# Patient Record
Sex: MALE | Race: WHITE | NOT HISPANIC OR LATINO
[De-identification: names, ages, dates, MRNs, and addresses within clinical notes are randomized per-mention and may not be internally consistent; named-entity substitution may affect disease eponyms.]

---

## 2017-03-02 ENCOUNTER — LABORATORY RESULT (OUTPATIENT)
Age: 62
End: 2017-03-02

## 2017-03-02 ENCOUNTER — APPOINTMENT (OUTPATIENT)
Dept: INTERNAL MEDICINE | Facility: CLINIC | Age: 62
End: 2017-03-02

## 2017-03-03 LAB
25(OH)D3 SERPL-MCNC: 30 NG/ML
ALBUMIN SERPL ELPH-MCNC: 4.6 G/DL
ALP BLD-CCNC: 67 U/L
ALT SERPL-CCNC: 25 U/L
ANION GAP SERPL CALC-SCNC: 17 MMOL/L
APPEARANCE: CLEAR
AST SERPL-CCNC: 20 U/L
BASOPHILS # BLD AUTO: 0.04 K/UL
BASOPHILS NFR BLD AUTO: 0.7 %
BILIRUB SERPL-MCNC: 0.8 MG/DL
BILIRUBIN URINE: NEGATIVE
BLOOD URINE: NEGATIVE
BUN SERPL-MCNC: 16 MG/DL
CALCIUM SERPL-MCNC: 9.7 MG/DL
CHLORIDE SERPL-SCNC: 102 MMOL/L
CHOLEST SERPL-MCNC: 188 MG/DL
CHOLEST/HDLC SERPL: 3.9 RATIO
CO2 SERPL-SCNC: 20 MMOL/L
COLOR: YELLOW
CREAT SERPL-MCNC: 1.24 MG/DL
EOSINOPHIL # BLD AUTO: 0.13 K/UL
EOSINOPHIL NFR BLD AUTO: 2.1 %
ERYTHROCYTE [SEDIMENTATION RATE] IN BLOOD BY WESTERGREN METHOD: 16 MM/HR
GLUCOSE QUALITATIVE U: NORMAL MG/DL
GLUCOSE SERPL-MCNC: 113 MG/DL
HCT VFR BLD CALC: 45.5 %
HDLC SERPL-MCNC: 48 MG/DL
HGB BLD-MCNC: 15.5 G/DL
IMM GRANULOCYTES NFR BLD AUTO: 0.2 %
KETONES URINE: NEGATIVE
LDLC SERPL CALC-MCNC: 102 MG/DL
LDLC SERPL DIRECT ASSAY-MCNC: 121 MG/DL
LEUKOCYTE ESTERASE URINE: NEGATIVE
LYMPHOCYTES # BLD AUTO: 1.84 K/UL
LYMPHOCYTES NFR BLD AUTO: 29.9 %
MAN DIFF?: NORMAL
MCHC RBC-ENTMCNC: 31.6 PG
MCHC RBC-ENTMCNC: 34.1 GM/DL
MCV RBC AUTO: 92.9 FL
MONOCYTES # BLD AUTO: 0.46 K/UL
MONOCYTES NFR BLD AUTO: 7.5 %
NEUTROPHILS # BLD AUTO: 3.67 K/UL
NEUTROPHILS NFR BLD AUTO: 59.6 %
NITRITE URINE: NEGATIVE
PH URINE: 5.5
PLATELET # BLD AUTO: 211 K/UL
POTASSIUM SERPL-SCNC: 4.2 MMOL/L
PROT SERPL-MCNC: 7.7 G/DL
PROTEIN URINE: NEGATIVE MG/DL
PSA SERPL-MCNC: 2.23 NG/ML
RBC # BLD: 4.9 M/UL
RBC # FLD: 13.6 %
SAVE SPECIMEN: NORMAL
SODIUM SERPL-SCNC: 139 MMOL/L
SPECIFIC GRAVITY URINE: 1.02
T3 SERPL-MCNC: 120 NG/DL
T3RU NFR SERPL: 0.99 INDEX
T4 FREE SERPL-MCNC: 1.2 NG/DL
TRIGL SERPL-MCNC: 189 MG/DL
TSH SERPL-ACNC: 2.44 UIU/ML
URATE SERPL-MCNC: 7.1 MG/DL
UROBILINOGEN URINE: NORMAL MG/DL
WBC # FLD AUTO: 6.15 K/UL

## 2017-03-10 ENCOUNTER — APPOINTMENT (OUTPATIENT)
Dept: INTERNAL MEDICINE | Facility: CLINIC | Age: 62
End: 2017-03-10

## 2017-03-10 VITALS
SYSTOLIC BLOOD PRESSURE: 138 MMHG | BODY MASS INDEX: 33.34 KG/M2 | TEMPERATURE: 96.9 F | DIASTOLIC BLOOD PRESSURE: 80 MMHG | WEIGHT: 220 LBS | HEIGHT: 68 IN

## 2017-07-20 ENCOUNTER — APPOINTMENT (OUTPATIENT)
Dept: INTERNAL MEDICINE | Facility: CLINIC | Age: 62
End: 2017-07-20

## 2017-07-20 VITALS
SYSTOLIC BLOOD PRESSURE: 124 MMHG | TEMPERATURE: 98.5 F | WEIGHT: 218 LBS | HEIGHT: 68 IN | DIASTOLIC BLOOD PRESSURE: 78 MMHG | BODY MASS INDEX: 33.04 KG/M2

## 2017-07-20 DIAGNOSIS — J30.2 OTHER SEASONAL ALLERGIC RHINITIS: ICD-10-CM

## 2017-07-20 DIAGNOSIS — H91.91 UNSPECIFIED HEARING LOSS, RIGHT EAR: ICD-10-CM

## 2017-07-20 RX ORDER — ASPIRIN 81 MG
6.5 TABLET, DELAYED RELEASE (ENTERIC COATED) ORAL
Qty: 1 | Refills: 0 | Status: ACTIVE | COMMUNITY
Start: 2017-07-20 | End: 1900-01-01

## 2018-03-05 ENCOUNTER — APPOINTMENT (OUTPATIENT)
Dept: RHEUMATOLOGY | Facility: CLINIC | Age: 63
End: 2018-03-05
Payer: MEDICARE

## 2018-03-05 VITALS
HEIGHT: 68 IN | TEMPERATURE: 98.1 F | WEIGHT: 220 LBS | DIASTOLIC BLOOD PRESSURE: 73 MMHG | BODY MASS INDEX: 33.34 KG/M2 | OXYGEN SATURATION: 98 % | HEART RATE: 79 BPM | SYSTOLIC BLOOD PRESSURE: 144 MMHG

## 2018-03-05 DIAGNOSIS — Z79.899 OTHER LONG TERM (CURRENT) DRUG THERAPY: ICD-10-CM

## 2018-03-05 PROCEDURE — 99214 OFFICE O/P EST MOD 30 MIN: CPT

## 2018-03-06 LAB
ALBUMIN SERPL ELPH-MCNC: 4.7 G/DL
ALP BLD-CCNC: 72 U/L
ALT SERPL-CCNC: 24 U/L
ANION GAP SERPL CALC-SCNC: 15 MMOL/L
AST SERPL-CCNC: 19 U/L
BASOPHILS # BLD AUTO: 0.03 K/UL
BASOPHILS NFR BLD AUTO: 0.5 %
BILIRUB SERPL-MCNC: 0.8 MG/DL
BUN SERPL-MCNC: 15 MG/DL
CALCIUM SERPL-MCNC: 9.4 MG/DL
CHLORIDE SERPL-SCNC: 103 MMOL/L
CHOLEST SERPL-MCNC: 189 MG/DL
CHOLEST/HDLC SERPL: 4 RATIO
CO2 SERPL-SCNC: 23 MMOL/L
CREAT SERPL-MCNC: 1.07 MG/DL
CRP SERPL-MCNC: 0.4 MG/DL
EOSINOPHIL # BLD AUTO: 0.11 K/UL
EOSINOPHIL NFR BLD AUTO: 1.8 %
ERYTHROCYTE [SEDIMENTATION RATE] IN BLOOD BY WESTERGREN METHOD: 22 MM/HR
GLUCOSE SERPL-MCNC: 99 MG/DL
HBA1C MFR BLD HPLC: 5.3 %
HCT VFR BLD CALC: 44.7 %
HDLC SERPL-MCNC: 47 MG/DL
HGB BLD-MCNC: 15 G/DL
IMM GRANULOCYTES NFR BLD AUTO: 0.2 %
LDLC SERPL CALC-MCNC: 99 MG/DL
LYMPHOCYTES # BLD AUTO: 1.87 K/UL
LYMPHOCYTES NFR BLD AUTO: 30.7 %
MAN DIFF?: NORMAL
MCHC RBC-ENTMCNC: 32.3 PG
MCHC RBC-ENTMCNC: 33.6 GM/DL
MCV RBC AUTO: 96.1 FL
MONOCYTES # BLD AUTO: 0.42 K/UL
MONOCYTES NFR BLD AUTO: 6.9 %
NEUTROPHILS # BLD AUTO: 3.66 K/UL
NEUTROPHILS NFR BLD AUTO: 59.9 %
PLATELET # BLD AUTO: 222 K/UL
POTASSIUM SERPL-SCNC: 4.4 MMOL/L
PROT SERPL-MCNC: 7.6 G/DL
PSA SERPL-MCNC: 3.15 NG/ML
RBC # BLD: 4.65 M/UL
RBC # FLD: 13.7 %
SODIUM SERPL-SCNC: 141 MMOL/L
T4 FREE SERPL-MCNC: 1.2 NG/DL
TRIGL SERPL-MCNC: 215 MG/DL
TSH SERPL-ACNC: 2.71 UIU/ML
URATE SERPL-MCNC: 7.6 MG/DL
WBC # FLD AUTO: 6.1 K/UL

## 2018-03-13 ENCOUNTER — RX RENEWAL (OUTPATIENT)
Age: 63
End: 2018-03-13

## 2018-03-22 ENCOUNTER — APPOINTMENT (OUTPATIENT)
Dept: INTERNAL MEDICINE | Facility: CLINIC | Age: 63
End: 2018-03-22

## 2018-03-27 ENCOUNTER — APPOINTMENT (OUTPATIENT)
Dept: INTERNAL MEDICINE | Facility: CLINIC | Age: 63
End: 2018-03-27
Payer: COMMERCIAL

## 2018-03-27 VITALS
DIASTOLIC BLOOD PRESSURE: 90 MMHG | SYSTOLIC BLOOD PRESSURE: 140 MMHG | HEIGHT: 68 IN | BODY MASS INDEX: 32.58 KG/M2 | WEIGHT: 215 LBS | TEMPERATURE: 97.2 F

## 2018-03-27 VITALS — SYSTOLIC BLOOD PRESSURE: 130 MMHG | DIASTOLIC BLOOD PRESSURE: 80 MMHG

## 2018-03-27 DIAGNOSIS — K21.9 GASTRO-ESOPHAGEAL REFLUX DISEASE W/OUT ESOPHAGITIS: ICD-10-CM

## 2018-03-27 DIAGNOSIS — Z12.11 ENCOUNTER FOR SCREENING FOR MALIGNANT NEOPLASM OF COLON: ICD-10-CM

## 2018-03-27 PROCEDURE — 94010 BREATHING CAPACITY TEST: CPT

## 2018-03-27 PROCEDURE — 93000 ELECTROCARDIOGRAM COMPLETE: CPT

## 2018-03-27 PROCEDURE — 99397 PER PM REEVAL EST PAT 65+ YR: CPT | Mod: 25

## 2018-06-22 ENCOUNTER — APPOINTMENT (OUTPATIENT)
Dept: INTERNAL MEDICINE | Facility: CLINIC | Age: 63
End: 2018-06-22

## 2018-07-24 PROBLEM — J30.2 SEASONAL ALLERGIES: Status: ACTIVE | Noted: 2017-07-20

## 2019-03-05 ENCOUNTER — MEDICATION RENEWAL (OUTPATIENT)
Age: 64
End: 2019-03-05

## 2019-03-29 ENCOUNTER — TRANSCRIPTION ENCOUNTER (OUTPATIENT)
Age: 64
End: 2019-03-29

## 2019-04-05 ENCOUNTER — LABORATORY RESULT (OUTPATIENT)
Age: 64
End: 2019-04-05

## 2019-04-05 ENCOUNTER — APPOINTMENT (OUTPATIENT)
Dept: INTERNAL MEDICINE | Facility: CLINIC | Age: 64
End: 2019-04-05
Payer: MEDICARE

## 2019-04-05 VITALS
OXYGEN SATURATION: 97 % | TEMPERATURE: 97.3 F | DIASTOLIC BLOOD PRESSURE: 90 MMHG | WEIGHT: 220 LBS | HEART RATE: 94 BPM | BODY MASS INDEX: 33.45 KG/M2 | SYSTOLIC BLOOD PRESSURE: 140 MMHG

## 2019-04-05 VITALS — HEIGHT: 68 IN

## 2019-04-05 PROCEDURE — 93000 ELECTROCARDIOGRAM COMPLETE: CPT

## 2019-04-05 PROCEDURE — 99214 OFFICE O/P EST MOD 30 MIN: CPT | Mod: 25

## 2019-04-05 PROCEDURE — G0438: CPT | Mod: 25

## 2019-04-05 PROCEDURE — 90670 PCV13 VACCINE IM: CPT

## 2019-04-05 PROCEDURE — G0009: CPT

## 2019-04-05 PROCEDURE — 94010 BREATHING CAPACITY TEST: CPT

## 2019-04-05 NOTE — ASSESSMENT
[FreeTextEntry1] : \par GERD / Cobb's - offered EGD\par \par trigger finger - offered ortho/ hand surgeon\par \par elevated BP - check 24 hour monitor\par \par hcm\par check CPE labs\par Prevnar 13 today\par colonoscopy\par healthy diet and exercise

## 2019-04-05 NOTE — HEALTH RISK ASSESSMENT
[Good] : ~his/her~  mood as  good [0] : 1) Little interest or pleasure doing things: Not at all (0) [1] : 2) Feeling down, depressed, or hopeless for several days (1) [With Family] : lives with family [# of Members in Household ___] :  household currently consist of [unfilled] member(s) [Unemployed] : unemployed [Single] : single [Fully functional (bathing, dressing, toileting, transferring, walking, feeding)] : Fully functional (bathing, dressing, toileting, transferring, walking, feeding) [Fully functional (using the telephone, shopping, preparing meals, housekeeping, doing laundry, using] : Fully functional and needs no help or supervision to perform IADLs (using the telephone, shopping, preparing meals, housekeeping, doing laundry, using transportation, managing medications and managing finances) [Name: ___] : Health Care Proxy's Name: [unfilled]  [] : No [de-identified] : socially [[id number]] : 1 [Reports changes in hearing] : Reports no changes in hearing [Reports changes in vision] : Reports no changes in vision [de-identified] : mother [de-identified] : Patient sees an ophthalmologist regularly [de-identified] : Patient sees a dentist regularly [AdvancecareDate] : 04/19

## 2019-04-05 NOTE — REVIEW OF SYSTEMS
[Night Sweats] : night sweats [Negative] : Genitourinary [Fever] : no fever [Constipation] : no constipation [Diarrhea] : no diarrhea [FreeTextEntry9] : see HPI

## 2019-04-05 NOTE — PHYSICAL EXAM
[No Acute Distress] : no acute distress [Well Nourished] : well nourished [Well Developed] : well developed [Normal Voice/Communication] : normal voice/communication [Normal Sclera/Conjunctiva] : normal sclera/conjunctiva [PERRL] : pupils equal round and reactive to light [EOMI] : extraocular movements intact [Normal Outer Ear/Nose] : the outer ears and nose were normal in appearance [Normal Oropharynx] : the oropharynx was normal [Normal TMs] : both tympanic membranes were normal [No JVD] : no jugular venous distention [Supple] : supple [No Lymphadenopathy] : no lymphadenopathy [No Respiratory Distress] : no respiratory distress  [Clear to Auscultation] : lungs were clear to auscultation bilaterally [No Accessory Muscle Use] : no accessory muscle use [Normal Rate] : normal rate  [Regular Rhythm] : with a regular rhythm [Normal S1, S2] : normal S1 and S2 [Soft] : abdomen soft [Non Tender] : non-tender [Non-distended] : non-distended [No HSM] : no HSM [Normal Bowel Sounds] : normal bowel sounds [Normal Supraclavicular Nodes] : no supraclavicular lymphadenopathy [Normal Anterior Cervical Nodes] : no anterior cervical lymphadenopathy

## 2019-04-05 NOTE — COUNSELING
[Healthy eating counseling provided] : healthy eating [Activity counseling provided] : activity [de-identified] : \par goes to gym; weights , rowing

## 2019-04-05 NOTE — HISTORY OF PRESENT ILLNESS
[de-identified] : \par No gout attacks on Allopurinol; had been seeing rheumatology\par \par Has no healthy related concerns now other than his allergies and trouble sleeping - awakens in middle of the night, gets back to sleep around aliyah\par Takes Tylenol for sinus HAs\par \par Living with mother - who cooks for him and serves red meat and buys cookies\par \par Has no GERD symptoms (for him, cough); refuses to take PPIs\par \par right 3rd trigger digit\par \par \par

## 2019-04-07 LAB
25(OH)D3 SERPL-MCNC: 24.7 NG/ML
ALBUMIN SERPL ELPH-MCNC: 4.9 G/DL
ALP BLD-CCNC: 68 U/L
ALT SERPL-CCNC: 33 U/L
ANION GAP SERPL CALC-SCNC: 17 MMOL/L
APPEARANCE: CLEAR
AST SERPL-CCNC: 29 U/L
BACTERIA: NEGATIVE
BASOPHILS # BLD AUTO: 0.05 K/UL
BASOPHILS NFR BLD AUTO: 1 %
BILIRUB SERPL-MCNC: 0.8 MG/DL
BILIRUBIN URINE: NEGATIVE
BLOOD URINE: NORMAL
BUN SERPL-MCNC: 16 MG/DL
CALCIUM SERPL-MCNC: 9.9 MG/DL
CHLORIDE SERPL-SCNC: 102 MMOL/L
CHOLEST SERPL-MCNC: 187 MG/DL
CHOLEST/HDLC SERPL: 4.1 RATIO
CO2 SERPL-SCNC: 22 MMOL/L
COLOR: NORMAL
CREAT SERPL-MCNC: 1.08 MG/DL
EOSINOPHIL # BLD AUTO: 0.11 K/UL
EOSINOPHIL NFR BLD AUTO: 2.1 %
GLUCOSE QUALITATIVE U: NEGATIVE
GLUCOSE SERPL-MCNC: 109 MG/DL
HCT VFR BLD CALC: 49.7 %
HDLC SERPL-MCNC: 46 MG/DL
HGB BLD-MCNC: 15.6 G/DL
HYALINE CASTS: 0 /LPF
IMM GRANULOCYTES NFR BLD AUTO: 0.2 %
KETONES URINE: NEGATIVE
LDLC SERPL CALC-MCNC: 105 MG/DL
LDLC SERPL DIRECT ASSAY-MCNC: 130 MG/DL
LEUKOCYTE ESTERASE URINE: NEGATIVE
LYMPHOCYTES # BLD AUTO: 1.44 K/UL
LYMPHOCYTES NFR BLD AUTO: 27.8 %
MAN DIFF?: NORMAL
MCHC RBC-ENTMCNC: 31.4 GM/DL
MCHC RBC-ENTMCNC: 31.4 PG
MCV RBC AUTO: 100 FL
MICROSCOPIC-UA: NORMAL
MONOCYTES # BLD AUTO: 0.42 K/UL
MONOCYTES NFR BLD AUTO: 8.1 %
NEUTROPHILS # BLD AUTO: 3.15 K/UL
NEUTROPHILS NFR BLD AUTO: 60.8 %
NITRITE URINE: NEGATIVE
PH URINE: 6
PLATELET # BLD AUTO: 206 K/UL
POTASSIUM SERPL-SCNC: 4.4 MMOL/L
PROT SERPL-MCNC: 8.1 G/DL
PROTEIN URINE: NEGATIVE
PSA SERPL-MCNC: 4.14 NG/ML
RBC # BLD: 4.97 M/UL
RBC # FLD: 13.3 %
RED BLOOD CELLS URINE: 3 /HPF
SODIUM SERPL-SCNC: 141 MMOL/L
SPECIFIC GRAVITY URINE: 1.02
SQUAMOUS EPITHELIAL CELLS: 0 /HPF
T4 FREE SERPL-MCNC: 1.3 NG/DL
TRIGL SERPL-MCNC: 179 MG/DL
TSH SERPL-ACNC: 2.87 UIU/ML
URATE SERPL-MCNC: 6.9 MG/DL
UROBILINOGEN URINE: NORMAL
WBC # FLD AUTO: 5.18 K/UL
WHITE BLOOD CELLS URINE: 1 /HPF

## 2019-05-01 ENCOUNTER — APPOINTMENT (OUTPATIENT)
Dept: INTERNAL MEDICINE | Facility: CLINIC | Age: 64
End: 2019-05-01

## 2019-05-14 ENCOUNTER — APPOINTMENT (OUTPATIENT)
Dept: UROLOGY | Facility: CLINIC | Age: 64
End: 2019-05-14
Payer: MEDICARE

## 2019-05-14 PROCEDURE — 99203 OFFICE O/P NEW LOW 30 MIN: CPT

## 2019-05-22 LAB
PSA FREE FLD-MCNC: 19 %
PSA FREE SERPL-MCNC: 0.64 NG/ML
PSA SERPL-MCNC: 3.38 NG/ML

## 2020-02-15 DIAGNOSIS — K62.5 HEMORRHAGE OF ANUS AND RECTUM: ICD-10-CM

## 2020-02-15 RX ORDER — HYDROCORTISONE 10 MG/G
1 CREAM TOPICAL TWICE DAILY
Qty: 1 | Refills: 0 | Status: ACTIVE | COMMUNITY
Start: 2020-02-15 | End: 1900-01-01

## 2020-04-08 ENCOUNTER — APPOINTMENT (OUTPATIENT)
Dept: INTERNAL MEDICINE | Facility: CLINIC | Age: 65
End: 2020-04-08

## 2020-04-23 ENCOUNTER — APPOINTMENT (OUTPATIENT)
Dept: INTERNAL MEDICINE | Facility: CLINIC | Age: 65
End: 2020-04-23
Payer: MEDICARE

## 2020-04-23 DIAGNOSIS — R10.9 UNSPECIFIED ABDOMINAL PAIN: ICD-10-CM

## 2020-04-23 PROCEDURE — 99214 OFFICE O/P EST MOD 30 MIN: CPT | Mod: 95

## 2020-04-23 NOTE — PHYSICAL EXAM
[General Appearance - Alert] : alert [General Appearance - In No Acute Distress] : in no acute distress [FreeTextEntry1] : self-palpates abdomen - nontender

## 2020-04-23 NOTE — HISTORY OF PRESENT ILLNESS
[Home] : at home, [unfilled] , at the time of the visit. [Other Location: e.g. Home (Enter Location, City,State)___] : at [unfilled] [FreeTextEntry1] : After repeated attempts to join the Tele[employer] platform - he could not connect (tried for 20 minutes)\par Proceeded with audio only\par \par Yesterday - out of the blue- upon awakening around 1pm or 2pm (wakes up late) - was caring for his mother - onset of right sided abd pain, by hip\par Pain may have been related to bending; sharp pain, continuous\par no clear change with meals - only had banana and tea for breakfast (eats late); \par Also noted penile meatus was stinging\par Pain got worse\par Was constipated - unusual for him; still has not had a BM\par Was passing gas\par 5 pm last night - called office and spoke with on call nurse - couldn't stand up or walk\par vomited\par Spoke with MD on call\par was told to go to ER; but pain started to subside\par Then spoke with me - feeling better\par Now feels even better - pain may have moved more toward the belly - over entire abd\par \par within a month - sedentary and more constipated\par Also tells me he had  tuna fish; but no diarrhea\par

## 2020-04-23 NOTE — ASSESSMENT
[FreeTextEntry1] : \par resolved abdominal pain\par \par ?related to constipation\par ?muscular pain - related to picking up mother; it did feel "like a spasm", "stabbing"\par will let me know if symptoms recur\par will make plans for mother in case he has to leave for an emergency

## 2020-04-27 ENCOUNTER — TRANSCRIPTION ENCOUNTER (OUTPATIENT)
Age: 65
End: 2020-04-27

## 2020-07-23 ENCOUNTER — APPOINTMENT (OUTPATIENT)
Dept: INTERNAL MEDICINE | Facility: CLINIC | Age: 65
End: 2020-07-23
Payer: MEDICARE

## 2020-07-23 VITALS
TEMPERATURE: 98.4 F | SYSTOLIC BLOOD PRESSURE: 130 MMHG | DIASTOLIC BLOOD PRESSURE: 80 MMHG | HEIGHT: 68 IN | BODY MASS INDEX: 29.1 KG/M2 | WEIGHT: 192 LBS | HEART RATE: 78 BPM | OXYGEN SATURATION: 98 %

## 2020-07-23 DIAGNOSIS — F32.9 MAJOR DEPRESSIVE DISORDER, SINGLE EPISODE, UNSPECIFIED: ICD-10-CM

## 2020-07-23 PROCEDURE — 99213 OFFICE O/P EST LOW 20 MIN: CPT | Mod: 25

## 2020-07-23 PROCEDURE — 90732 PPSV23 VACC 2 YRS+ SUBQ/IM: CPT

## 2020-07-23 PROCEDURE — G0438: CPT | Mod: 25

## 2020-07-23 PROCEDURE — 99214 OFFICE O/P EST MOD 30 MIN: CPT | Mod: 25

## 2020-07-23 PROCEDURE — G0009: CPT

## 2020-07-23 PROCEDURE — 93000 ELECTROCARDIOGRAM COMPLETE: CPT

## 2020-08-12 LAB
ALBUMIN SERPL ELPH-MCNC: 4.9 G/DL
ALP BLD-CCNC: 71 U/L
ALT SERPL-CCNC: 15 U/L
ANION GAP SERPL CALC-SCNC: 13 MMOL/L
APPEARANCE: CLEAR
AST SERPL-CCNC: 16 U/L
BASOPHILS # BLD AUTO: 0.04 K/UL
BASOPHILS NFR BLD AUTO: 0.8 %
BILIRUB SERPL-MCNC: 1.1 MG/DL
BILIRUBIN URINE: NEGATIVE
BLOOD URINE: NEGATIVE
BUN SERPL-MCNC: 13 MG/DL
CALCIUM SERPL-MCNC: 10.2 MG/DL
CHLORIDE SERPL-SCNC: 102 MMOL/L
CHOLEST SERPL-MCNC: 183 MG/DL
CHOLEST/HDLC SERPL: 3.8 RATIO
CO2 SERPL-SCNC: 25 MMOL/L
COLOR: NORMAL
CREAT SERPL-MCNC: 1.24 MG/DL
EOSINOPHIL # BLD AUTO: 0.07 K/UL
EOSINOPHIL NFR BLD AUTO: 1.4 %
GLUCOSE QUALITATIVE U: NEGATIVE
GLUCOSE SERPL-MCNC: 109 MG/DL
HCT VFR BLD CALC: 47.6 %
HDLC SERPL-MCNC: 48 MG/DL
HGB BLD-MCNC: 15 G/DL
IMM GRANULOCYTES NFR BLD AUTO: 0.4 %
KETONES URINE: NEGATIVE
LDLC SERPL CALC-MCNC: 99 MG/DL
LDLC SERPL DIRECT ASSAY-MCNC: 109 MG/DL
LEUKOCYTE ESTERASE URINE: NEGATIVE
LYMPHOCYTES # BLD AUTO: 1.22 K/UL
LYMPHOCYTES NFR BLD AUTO: 24.2 %
MAN DIFF?: NORMAL
MCHC RBC-ENTMCNC: 31.1 PG
MCHC RBC-ENTMCNC: 31.5 GM/DL
MCV RBC AUTO: 98.8 FL
MONOCYTES # BLD AUTO: 0.39 K/UL
MONOCYTES NFR BLD AUTO: 7.7 %
NEUTROPHILS # BLD AUTO: 3.31 K/UL
NEUTROPHILS NFR BLD AUTO: 65.5 %
NITRITE URINE: NEGATIVE
PH URINE: 6
PLATELET # BLD AUTO: 202 K/UL
POTASSIUM SERPL-SCNC: 5.3 MMOL/L
PROT SERPL-MCNC: 7.7 G/DL
PROTEIN URINE: NEGATIVE
PSA SERPL-MCNC: 4.09 NG/ML
RBC # BLD: 4.82 M/UL
RBC # FLD: 13 %
SODIUM SERPL-SCNC: 141 MMOL/L
SPECIFIC GRAVITY URINE: 1.01
T4 FREE SERPL-MCNC: 1.4 NG/DL
TRIGL SERPL-MCNC: 183 MG/DL
TSH SERPL-ACNC: 2.07 UIU/ML
URATE SERPL-MCNC: 6 MG/DL
UROBILINOGEN URINE: NORMAL
WBC # FLD AUTO: 5.05 K/UL

## 2020-10-03 ENCOUNTER — APPOINTMENT (OUTPATIENT)
Dept: INTERNAL MEDICINE | Facility: CLINIC | Age: 65
End: 2020-10-03
Payer: MEDICARE

## 2020-10-03 ENCOUNTER — TRANSCRIPTION ENCOUNTER (OUTPATIENT)
Age: 65
End: 2020-10-03

## 2020-10-03 PROCEDURE — 99441: CPT

## 2020-10-05 ENCOUNTER — EMERGENCY (EMERGENCY)
Facility: HOSPITAL | Age: 65
LOS: 1 days | Discharge: HOME | End: 2020-10-05
Attending: EMERGENCY MEDICINE
Payer: MEDICARE

## 2020-10-05 VITALS
SYSTOLIC BLOOD PRESSURE: 159 MMHG | WEIGHT: 190.04 LBS | OXYGEN SATURATION: 98 % | HEART RATE: 81 BPM | DIASTOLIC BLOOD PRESSURE: 101 MMHG | RESPIRATION RATE: 20 BRPM | TEMPERATURE: 98 F | HEIGHT: 68 IN

## 2020-10-05 VITALS
DIASTOLIC BLOOD PRESSURE: 78 MMHG | TEMPERATURE: 98 F | SYSTOLIC BLOOD PRESSURE: 135 MMHG | OXYGEN SATURATION: 98 % | RESPIRATION RATE: 20 BRPM | HEART RATE: 80 BPM

## 2020-10-05 LAB
ALBUMIN SERPL ELPH-MCNC: 4.5 G/DL — SIGNIFICANT CHANGE UP (ref 3.3–5)
ALP SERPL-CCNC: 80 U/L — SIGNIFICANT CHANGE UP (ref 40–120)
ALT FLD-CCNC: 13 U/L — SIGNIFICANT CHANGE UP (ref 10–45)
ANION GAP SERPL CALC-SCNC: 13 MMOL/L — SIGNIFICANT CHANGE UP (ref 5–17)
APPEARANCE UR: CLEAR — SIGNIFICANT CHANGE UP
AST SERPL-CCNC: 17 U/L — SIGNIFICANT CHANGE UP (ref 10–40)
BACTERIA # UR AUTO: NEGATIVE — SIGNIFICANT CHANGE UP
BASOPHILS # BLD AUTO: 0.03 K/UL — SIGNIFICANT CHANGE UP (ref 0–0.2)
BASOPHILS NFR BLD AUTO: 0.3 % — SIGNIFICANT CHANGE UP (ref 0–2)
BILIRUB SERPL-MCNC: 0.8 MG/DL — SIGNIFICANT CHANGE UP (ref 0.2–1.2)
BILIRUB UR-MCNC: NEGATIVE — SIGNIFICANT CHANGE UP
BUN SERPL-MCNC: 19 MG/DL — SIGNIFICANT CHANGE UP (ref 7–23)
CALCIUM SERPL-MCNC: 9.9 MG/DL — SIGNIFICANT CHANGE UP (ref 8.4–10.5)
CHLORIDE SERPL-SCNC: 104 MMOL/L — SIGNIFICANT CHANGE UP (ref 96–108)
CO2 SERPL-SCNC: 21 MMOL/L — LOW (ref 22–31)
COLOR SPEC: SIGNIFICANT CHANGE UP
CREAT SERPL-MCNC: 1.12 MG/DL — SIGNIFICANT CHANGE UP (ref 0.5–1.3)
DIFF PNL FLD: ABNORMAL
EOSINOPHIL # BLD AUTO: 0.02 K/UL — SIGNIFICANT CHANGE UP (ref 0–0.5)
EOSINOPHIL NFR BLD AUTO: 0.2 % — SIGNIFICANT CHANGE UP (ref 0–6)
EPI CELLS # UR: 0 /HPF — SIGNIFICANT CHANGE UP
GLUCOSE SERPL-MCNC: 115 MG/DL — HIGH (ref 70–99)
GLUCOSE UR QL: NEGATIVE — SIGNIFICANT CHANGE UP
HCT VFR BLD CALC: 44.5 % — SIGNIFICANT CHANGE UP (ref 39–50)
HGB BLD-MCNC: 14.8 G/DL — SIGNIFICANT CHANGE UP (ref 13–17)
HYALINE CASTS # UR AUTO: 1 /LPF — SIGNIFICANT CHANGE UP (ref 0–2)
IMM GRANULOCYTES NFR BLD AUTO: 0.3 % — SIGNIFICANT CHANGE UP (ref 0–1.5)
KETONES UR-MCNC: NEGATIVE — SIGNIFICANT CHANGE UP
LEUKOCYTE ESTERASE UR-ACNC: NEGATIVE — SIGNIFICANT CHANGE UP
LYMPHOCYTES # BLD AUTO: 0.9 K/UL — LOW (ref 1–3.3)
LYMPHOCYTES # BLD AUTO: 9.8 % — LOW (ref 13–44)
MCHC RBC-ENTMCNC: 31.2 PG — SIGNIFICANT CHANGE UP (ref 27–34)
MCHC RBC-ENTMCNC: 33.3 GM/DL — SIGNIFICANT CHANGE UP (ref 32–36)
MCV RBC AUTO: 93.7 FL — SIGNIFICANT CHANGE UP (ref 80–100)
MONOCYTES # BLD AUTO: 0.58 K/UL — SIGNIFICANT CHANGE UP (ref 0–0.9)
MONOCYTES NFR BLD AUTO: 6.3 % — SIGNIFICANT CHANGE UP (ref 2–14)
NEUTROPHILS # BLD AUTO: 7.67 K/UL — HIGH (ref 1.8–7.4)
NEUTROPHILS NFR BLD AUTO: 83.1 % — HIGH (ref 43–77)
NITRITE UR-MCNC: NEGATIVE — SIGNIFICANT CHANGE UP
NRBC # BLD: 0 /100 WBCS — SIGNIFICANT CHANGE UP (ref 0–0)
PH UR: 5.5 — SIGNIFICANT CHANGE UP (ref 5–8)
PLATELET # BLD AUTO: 186 K/UL — SIGNIFICANT CHANGE UP (ref 150–400)
POTASSIUM SERPL-MCNC: 3.9 MMOL/L — SIGNIFICANT CHANGE UP (ref 3.5–5.3)
POTASSIUM SERPL-SCNC: 3.9 MMOL/L — SIGNIFICANT CHANGE UP (ref 3.5–5.3)
PROT SERPL-MCNC: 7.9 G/DL — SIGNIFICANT CHANGE UP (ref 6–8.3)
PROT UR-MCNC: NEGATIVE — SIGNIFICANT CHANGE UP
RBC # BLD: 4.75 M/UL — SIGNIFICANT CHANGE UP (ref 4.2–5.8)
RBC # FLD: 12.9 % — SIGNIFICANT CHANGE UP (ref 10.3–14.5)
RBC CASTS # UR COMP ASSIST: 18 /HPF — HIGH (ref 0–4)
SODIUM SERPL-SCNC: 138 MMOL/L — SIGNIFICANT CHANGE UP (ref 135–145)
SP GR SPEC: 1.02 — SIGNIFICANT CHANGE UP (ref 1.01–1.02)
UROBILINOGEN FLD QL: NEGATIVE — SIGNIFICANT CHANGE UP
WBC # BLD: 9.23 K/UL — SIGNIFICANT CHANGE UP (ref 3.8–10.5)
WBC # FLD AUTO: 9.23 K/UL — SIGNIFICANT CHANGE UP (ref 3.8–10.5)
WBC UR QL: 0 /HPF — SIGNIFICANT CHANGE UP (ref 0–5)

## 2020-10-05 PROCEDURE — 74176 CT ABD & PELVIS W/O CONTRAST: CPT

## 2020-10-05 PROCEDURE — 99284 EMERGENCY DEPT VISIT MOD MDM: CPT | Mod: 25

## 2020-10-05 PROCEDURE — 85025 COMPLETE CBC W/AUTO DIFF WBC: CPT

## 2020-10-05 PROCEDURE — 80053 COMPREHEN METABOLIC PANEL: CPT

## 2020-10-05 PROCEDURE — 99285 EMERGENCY DEPT VISIT HI MDM: CPT

## 2020-10-05 PROCEDURE — 81001 URINALYSIS AUTO W/SCOPE: CPT

## 2020-10-05 PROCEDURE — 74176 CT ABD & PELVIS W/O CONTRAST: CPT | Mod: 26

## 2020-10-05 PROCEDURE — 87086 URINE CULTURE/COLONY COUNT: CPT

## 2020-10-05 RX ORDER — IBUPROFEN 200 MG
600 TABLET ORAL ONCE
Refills: 0 | Status: COMPLETED | OUTPATIENT
Start: 2020-10-05 | End: 2020-10-05

## 2020-10-05 RX ORDER — KETOROLAC TROMETHAMINE 30 MG/ML
15 SYRINGE (ML) INJECTION ONCE
Refills: 0 | Status: DISCONTINUED | OUTPATIENT
Start: 2020-10-05 | End: 2020-10-05

## 2020-10-05 RX ORDER — TAMSULOSIN HYDROCHLORIDE 0.4 MG/1
0.4 CAPSULE ORAL ONCE
Refills: 0 | Status: COMPLETED | OUTPATIENT
Start: 2020-10-05 | End: 2020-10-05

## 2020-10-05 RX ORDER — TAMSULOSIN HYDROCHLORIDE 0.4 MG/1
1 CAPSULE ORAL
Qty: 7 | Refills: 0
Start: 2020-10-05 | End: 2020-10-11

## 2020-10-05 RX ADMIN — TAMSULOSIN HYDROCHLORIDE 0.4 MILLIGRAM(S): 0.4 CAPSULE ORAL at 18:47

## 2020-10-05 RX ADMIN — Medication 600 MILLIGRAM(S): at 17:30

## 2020-10-05 NOTE — ED PROVIDER NOTE - OBJECTIVE STATEMENT
65y M pmhx gout on allopurinol, GERD on omeprazole presents to ED BIBEMS c/o L flank pain since Saturday. Pain is constant, sharp, 4/10 severity, radiating throughout flank associated nausea w/o vomiting, tolerating PO. Urgent care on Saturday, hematuria on UA, prescribed Cefdinir 300mg BID for suspect UTI. Asymptomatic on Sunday, work up this AM with L flank pain. H/o of similar symptoms R-sided in March, no w/u symptoms resolved on own. Admits to increased urinary urgency today. No fever, vomiting, dysuria, diarrhea, hx of abdominal sx. 65y M pmhx gout on allopurinol, GERD on omeprazole presents to ED BIBEMS c/o L flank pain since Saturday. Pain is constant, sharp, 4/10 severity, radiating throughout flank associated nausea w/o vomiting, tolerating PO. Urgent care on Saturday, hematuria on UA, prescribed Cefdinir 300mg BID for suspect UTI. Asymptomatic on Sunday, work up this AM with L flank pain. H/o of similar symptoms R-sided in March, no w/u symptoms resolved on own. Admits to increased urinary urgency today. No fever, vomiting, dysuria, diarrhea, hx of abdominal sx.      Attn - pt seen in [facility] - agree with above - pt with left flank dull aching that started Sat and went to [facility] and Rx Ab[facility].  continued pain.  No fever, chills, rigors, hematuria, n/v. Hx of R renal stones and gout - allopurinol.  no trauma. no radiation, no hx of hernia

## 2020-10-05 NOTE — ED PROVIDER NOTE - CLINICAL SUMMARY MEDICAL DECISION MAKING FREE TEXT BOX
Attn - pt with left flank pain x 2 days without fever, n/v,  CT stone hunt - declines analgesia at this time.

## 2020-10-05 NOTE — ED PROVIDER NOTE - NSFOLLOWUPCLINICS_GEN_ALL_ED_FT
[facility]  Urology  [address] [place] [address] - [address] [place] [address]  Phone: [phone]  Fax:   Follow Up Time:

## 2020-10-05 NOTE — ED ADULT NURSE NOTE - OBJECTIVE STATEMENT
66 yo M aaox4 c/o of left flank pain onset two days ago. Pain radiates to flank area, and described as sharp and   intermittent. Pt seen and medicated by urgent care but symptoms not improving. Denies N/V. IV line placed labs drawn   and sent

## 2020-10-05 NOTE — ED PROVIDER NOTE - PATIENT PORTAL LINK FT
You can access the FollowMyHealth Patient Portal offered by [facility] by registering at the following website: [email or web address]. By joining [employer]’s FollowMyHealth portal, you will also be able to view your health information using other applications (apps) compatible with our system.

## 2020-10-05 NOTE — ED PROVIDER NOTE - CARE PROVIDER_API CALL
Ramin Fernando  UROLOGY  [address] [place] [address], [place] [id number]  [place] [id number]  Phone: [phone]  Fax: [phone]  Follow Up Time:

## 2020-10-05 NOTE — ED PROVIDER NOTE - PHYSICAL EXAMINATION
GEN: Pt non-toxic in NAD, A&Ox3.  PSYCH: Affect and mood appropriate.  EYES: Sclera white w/o injection.  ENT: MMM. Neck supple FROM. Airway patent.  RESP: No chest wall tenderness, CTA b/l, no wheezes, rales, or rhonchi.   CARDIAC: RRR, clear distinct S1, S2, no appreciable murmurs.  ABD: Abdomen soft, non-tender. No CVAT b/l.  VASC: Radial and dorsalis pedis pulses 2+ b/l. No edema or calf tenderness.  SKIN: No rashes or lesions. GEN: Pt non-toxic in NAD, A&Ox3.  PSYCH: Affect and mood appropriate.  EYES: Sclera white w/o injection.  ENT: MM dry. Neck supple FROM. Airway patent.  RESP: No chest wall tenderness, CTA b/l, no wheezes, rales, or rhonchi.   CARDIAC: RRR, clear distinct S1, S2.  ABD: Abdomen soft, mild LLQ pain to deep palpation. No CVAT b/l.  VASC: Radial pulses 2+ b/l. No edema or calf tenderness.  SKIN: No rashes or lesions. GEN: Pt non-toxic in NAD, A&Ox3.  PSYCH: Affect and mood appropriate.  EYES: Sclera white w/o injection.  ENT: MM dry. Neck supple FROM. Airway patent.  RESP: No chest wall tenderness, CTA b/l, no wheezes, rales, or rhonchi.   CARDIAC: RRR, clear distinct S1, S2.  ABD: Abdomen soft, mild LLQ pain to deep palpation. No CVAT b/l.  VASC: Radial pulses 2+ b/l. No edema or calf tenderness.  SKIN: No rashes or lesions.     Attn - alert, nad, pain 4/10 currently, no pallor, + CVAT on left, abdo - ND, ND, no hernia, no scrotal swelling or tenderness no testicular masses.

## 2020-10-05 NOTE — ED PROVIDER NOTE - NSFOLLOWUPINSTRUCTIONS_ED_ALL_ED_FT
1) Follow-up with your primary care provider within 2-3 days.       Follow-up with urology in 2-3 days.       Bring all printed results to discuss.    2) Pain can be managed with Acetaminophen 975mg (3 regular strength tablets) every 6 hours and Ibuprofen 600mg (3 regular strength pills) every 8 hours. For severe pain Oxycodone 5mg may be taken every 8 hours, this medication causes drowsiness avoid lifting, operating machinery, and drinking alcohol when taking oxycodone.    3) Make sure to stay hydrated. Strain urine, if you collect a stone bring it to your urologist for evaluation.    4) Continue to take all other medications as directed.    5) Return to the emergency room immediately if your symptoms worsen or persist, or if you have a high or concerning fever, back pain, abdominal pain, severe vomiting, difficulty urinating, pain with urination, new rash, discharge, or if any other concerning or questionable symptoms. 1) Follow-up with your primary care provider within 2-3 days.       Follow-up with urology in 2-3 days.       Bring all printed results to discuss.    2) Pain can be managed with Acetaminophen 975mg (3 regular strength tablets) every 6 hours and Ibuprofen 600mg (3 regular strength pills) every 8 hours. For severe pain Oxycodone 5mg may be taken every 8 hours, this medication causes drowsiness avoid lifting, operating machinery, and drinking alcohol when taking oxycodone.    3) Make sure to stay hydrated. Take Flomax 0.4mg at night. Continue to strain urine, if you collect a stone bring it to your urologist for evaluation.    4) Continue to take all other medications as directed.    5) Return to the emergency room immediately if your symptoms worsen or persist, or if you have a high or concerning fever, back pain, abdominal pain, severe vomiting, difficulty urinating, pain with urination, new rash, discharge, or if any other concerning or questionable symptoms.

## 2020-10-05 NOTE — ED PROCEDURE NOTE - US CPT CODES
59945 Ophthalmic US, diagnostic; B-scan (Retinal, Vitreous/ICP eval) 63899 Retroperitioneal Abdominal (Aorta/Renal)

## 2020-10-06 LAB
CULTURE RESULTS: NO GROWTH — SIGNIFICANT CHANGE UP
SPECIMEN SOURCE: SIGNIFICANT CHANGE UP

## 2020-10-07 ENCOUNTER — APPOINTMENT (OUTPATIENT)
Dept: GASTROENTEROLOGY | Facility: CLINIC | Age: 65
End: 2020-10-07
Payer: MEDICARE

## 2020-10-07 PROCEDURE — 99442: CPT

## 2020-10-13 ENCOUNTER — APPOINTMENT (OUTPATIENT)
Dept: UROLOGY | Facility: CLINIC | Age: 65
End: 2020-10-13
Payer: MEDICARE

## 2020-10-13 VITALS — TEMPERATURE: 97.2 F

## 2020-10-13 VITALS
SYSTOLIC BLOOD PRESSURE: 136 MMHG | BODY MASS INDEX: 28.95 KG/M2 | DIASTOLIC BLOOD PRESSURE: 76 MMHG | HEART RATE: 85 BPM | WEIGHT: 191 LBS | HEIGHT: 68 IN

## 2020-10-13 PROCEDURE — 99214 OFFICE O/P EST MOD 30 MIN: CPT

## 2020-10-16 LAB
PSA FREE FLD-MCNC: 21 %
PSA FREE SERPL-MCNC: 0.8 NG/ML
PSA SERPL-MCNC: 3.9 NG/ML

## 2020-10-29 ENCOUNTER — NON-APPOINTMENT (OUTPATIENT)
Age: 65
End: 2020-10-29

## 2020-11-18 ENCOUNTER — NON-APPOINTMENT (OUTPATIENT)
Age: 65
End: 2020-11-18

## 2020-11-18 ENCOUNTER — APPOINTMENT (OUTPATIENT)
Dept: INTERNAL MEDICINE | Facility: CLINIC | Age: 65
End: 2020-11-18
Payer: MEDICARE

## 2020-11-18 VITALS
WEIGHT: 194 LBS | OXYGEN SATURATION: 99 % | SYSTOLIC BLOOD PRESSURE: 140 MMHG | TEMPERATURE: 97.1 F | BODY MASS INDEX: 29.4 KG/M2 | HEIGHT: 68 IN | HEART RATE: 78 BPM | DIASTOLIC BLOOD PRESSURE: 80 MMHG

## 2020-11-18 DIAGNOSIS — F41.9 ANXIETY DISORDER, UNSPECIFIED: ICD-10-CM

## 2020-11-18 DIAGNOSIS — G89.29 PAIN IN LEFT SHOULDER: ICD-10-CM

## 2020-11-18 DIAGNOSIS — M25.512 PAIN IN LEFT SHOULDER: ICD-10-CM

## 2020-11-18 PROCEDURE — 36415 COLL VENOUS BLD VENIPUNCTURE: CPT

## 2020-11-18 PROCEDURE — 99214 OFFICE O/P EST MOD 30 MIN: CPT | Mod: 25

## 2020-11-19 LAB
ALBUMIN SERPL ELPH-MCNC: 4.5 G/DL
ALP BLD-CCNC: 77 U/L
ALT SERPL-CCNC: 14 U/L
ANION GAP SERPL CALC-SCNC: 11 MMOL/L
AST SERPL-CCNC: 14 U/L
BASOPHILS # BLD AUTO: 0.04 K/UL
BASOPHILS NFR BLD AUTO: 0.8 %
BILIRUB SERPL-MCNC: 0.8 MG/DL
BUN SERPL-MCNC: 17 MG/DL
CALCIUM SERPL-MCNC: 9.4 MG/DL
CHLORIDE SERPL-SCNC: 106 MMOL/L
CO2 SERPL-SCNC: 25 MMOL/L
CREAT SERPL-MCNC: 1.04 MG/DL
EOSINOPHIL # BLD AUTO: 0.06 K/UL
EOSINOPHIL NFR BLD AUTO: 1.2 %
GLUCOSE SERPL-MCNC: 113 MG/DL
HCT VFR BLD CALC: 43.4 %
HGB BLD-MCNC: 14.3 G/DL
IMM GRANULOCYTES NFR BLD AUTO: 0 %
LYMPHOCYTES # BLD AUTO: 0.99 K/UL
LYMPHOCYTES NFR BLD AUTO: 19.9 %
MAN DIFF?: NORMAL
MCHC RBC-ENTMCNC: 30.9 PG
MCHC RBC-ENTMCNC: 32.9 GM/DL
MCV RBC AUTO: 93.7 FL
MONOCYTES # BLD AUTO: 0.35 K/UL
MONOCYTES NFR BLD AUTO: 7 %
NEUTROPHILS # BLD AUTO: 3.53 K/UL
NEUTROPHILS NFR BLD AUTO: 71.1 %
PLATELET # BLD AUTO: 197 K/UL
POTASSIUM SERPL-SCNC: 4.5 MMOL/L
PROT SERPL-MCNC: 7.1 G/DL
RBC # BLD: 4.63 M/UL
RBC # FLD: 13.1 %
SAVE SPECIMEN: NORMAL
SODIUM SERPL-SCNC: 141 MMOL/L
WBC # FLD AUTO: 4.97 K/UL

## 2020-11-21 ENCOUNTER — APPOINTMENT (OUTPATIENT)
Dept: INTERNAL MEDICINE | Facility: CLINIC | Age: 65
End: 2020-11-21
Payer: MEDICARE

## 2020-11-21 ENCOUNTER — NON-APPOINTMENT (OUTPATIENT)
Age: 65
End: 2020-11-21

## 2020-11-21 ENCOUNTER — RESULT CHARGE (OUTPATIENT)
Age: 65
End: 2020-11-21

## 2020-11-21 VITALS
SYSTOLIC BLOOD PRESSURE: 140 MMHG | BODY MASS INDEX: 29.4 KG/M2 | HEIGHT: 68 IN | TEMPERATURE: 97.4 F | DIASTOLIC BLOOD PRESSURE: 80 MMHG | WEIGHT: 194 LBS

## 2020-11-21 DIAGNOSIS — R31.9 HEMATURIA, UNSPECIFIED: ICD-10-CM

## 2020-11-21 PROCEDURE — 99214 OFFICE O/P EST MOD 30 MIN: CPT | Mod: 25

## 2020-11-21 PROCEDURE — 81002 URINALYSIS NONAUTO W/O SCOPE: CPT

## 2020-11-23 ENCOUNTER — NON-APPOINTMENT (OUTPATIENT)
Age: 65
End: 2020-11-23

## 2020-11-23 ENCOUNTER — OUTPATIENT (OUTPATIENT)
Dept: OUTPATIENT SERVICES | Facility: HOSPITAL | Age: 65
LOS: 1 days | End: 2020-11-23
Payer: MEDICARE

## 2020-11-23 ENCOUNTER — RESULT REVIEW (OUTPATIENT)
Age: 65
End: 2020-11-23

## 2020-11-23 ENCOUNTER — APPOINTMENT (OUTPATIENT)
Dept: ULTRASOUND IMAGING | Facility: IMAGING CENTER | Age: 65
End: 2020-11-23
Payer: MEDICARE

## 2020-11-23 DIAGNOSIS — N20.1 CALCULUS OF URETER: ICD-10-CM

## 2020-11-23 DIAGNOSIS — N20.0 CALCULUS OF KIDNEY: ICD-10-CM

## 2020-11-23 PROCEDURE — 76770 US EXAM ABDO BACK WALL COMP: CPT | Mod: 26

## 2020-11-23 PROCEDURE — 76770 US EXAM ABDO BACK WALL COMP: CPT

## 2020-11-24 LAB — BACTERIA UR CULT: NORMAL

## 2020-12-03 ENCOUNTER — APPOINTMENT (OUTPATIENT)
Dept: UROLOGY | Facility: CLINIC | Age: 65
End: 2020-12-03
Payer: MEDICARE

## 2020-12-03 VITALS
DIASTOLIC BLOOD PRESSURE: 79 MMHG | BODY MASS INDEX: 28.49 KG/M2 | HEIGHT: 68 IN | HEART RATE: 83 BPM | WEIGHT: 188 LBS | SYSTOLIC BLOOD PRESSURE: 136 MMHG

## 2020-12-03 DIAGNOSIS — N20.0 CALCULUS OF KIDNEY: ICD-10-CM

## 2020-12-03 PROCEDURE — 99072 ADDL SUPL MATRL&STAF TM PHE: CPT

## 2020-12-03 PROCEDURE — 99213 OFFICE O/P EST LOW 20 MIN: CPT

## 2020-12-05 NOTE — HISTORY OF PRESENT ILLNESS
[FreeTextEntry1] : Juanito Deleon returns to the office today.  He is a 65-year-old man who I met in October of this year after he had developed flank pain related to a passing kidney stone on his left side.  I had advised him at the time that he may potentially require ureteroscopy to address the obstructing stone.  He also had a larger nonobstructing stone in the lower aspect of the left kidney that I told him may potentially require percutaneous nephrolithotomy to remove it.  He had decided against any interventions at that time because he has some obligations at home to take care of his mother and he is apparently unable to leave her unattended for any significant length of time.  He had been fairly asymptomatic but a few weeks ago noticed a repeat occurrence of gross hematuria along with flank pain and increased urinary frequency.  That now has also resolved after he passed some clots of blood.  He may have also passed the stone at that time but he did not clearly see it pass.\par \par Currently he feels well.  He denies any fevers or chills and has no nausea or vomiting.  His appetite and his weight are stable.  He did have a follow-up ultrasound done of his kidneys when he had developed this pain recently on November 23.  This showed a smaller burden of stone than the CT scan had shown earlier this year but I believe that the ultrasound may have missed the lower pole stone based on my review of the imaging.

## 2020-12-05 NOTE — ASSESSMENT
[FreeTextEntry1] : I do believe he did likely passed a left ureteral stone that was noted previously in the year in October.  I think this would account for the recurrence of the hematuria along with increase in urinary frequency which has now resolved and it did resolve rather acutely by the patient's report.  This does strongly suggest stone passage.  I am not recommending any additional imaging at this time as I doubt it would likely change any management strategy.  He understands that he could potentially require intervention should he develop recurrent flank pain or any fevers or chills.  I did strongly advise him also to see one of my colleagues who specializes with percutaneous nephrolithotomy and stone management.  I have provided him with a few names for that purpose and he will make an appointment accordingly.

## 2020-12-16 PROBLEM — Z12.11 ENCOUNTER FOR SCREENING COLONOSCOPY: Status: RESOLVED | Noted: 2018-03-27 | Resolved: 2020-12-16

## 2021-01-09 ENCOUNTER — APPOINTMENT (OUTPATIENT)
Dept: INTERNAL MEDICINE | Facility: CLINIC | Age: 66
End: 2021-01-09
Payer: MEDICARE

## 2021-01-09 VITALS
HEART RATE: 79 BPM | TEMPERATURE: 96.6 F | OXYGEN SATURATION: 98 % | WEIGHT: 190 LBS | BODY MASS INDEX: 28.79 KG/M2 | HEIGHT: 68 IN | SYSTOLIC BLOOD PRESSURE: 118 MMHG | RESPIRATION RATE: 17 BRPM | DIASTOLIC BLOOD PRESSURE: 80 MMHG

## 2021-01-09 DIAGNOSIS — R21 RASH AND OTHER NONSPECIFIC SKIN ERUPTION: ICD-10-CM

## 2021-01-09 DIAGNOSIS — L25.9 UNSPECIFIED CONTACT DERMATITIS, UNSPECIFIED CAUSE: ICD-10-CM

## 2021-01-09 DIAGNOSIS — Z20.822 CONTACT WITH AND (SUSPECTED) EXPOSURE TO COVID-19: ICD-10-CM

## 2021-01-09 PROCEDURE — 99072 ADDL SUPL MATRL&STAF TM PHE: CPT

## 2021-01-09 PROCEDURE — 36415 COLL VENOUS BLD VENIPUNCTURE: CPT

## 2021-01-09 PROCEDURE — 99213 OFFICE O/P EST LOW 20 MIN: CPT | Mod: 25

## 2021-01-09 RX ORDER — CLOBETASOL PROPIONATE 0.5 MG/G
0.05 CREAM TOPICAL TWICE DAILY
Qty: 1 | Refills: 3 | Status: ACTIVE | COMMUNITY
Start: 2021-01-09 | End: 1900-01-01

## 2021-01-11 LAB
SARS-COV-2 IGG SERPL IA-ACNC: 26.8 INDEX
SARS-COV-2 IGG SERPL QL IA: POSITIVE

## 2021-01-13 LAB
ALBUMIN SERPL ELPH-MCNC: 4.4 G/DL
ALP BLD-CCNC: 75 U/L
ALT SERPL-CCNC: 21 U/L
ANION GAP SERPL CALC-SCNC: 12 MMOL/L
AST SERPL-CCNC: 23 U/L
BASOPHILS # BLD AUTO: 0.04 K/UL
BASOPHILS NFR BLD AUTO: 0.7 %
BILIRUB SERPL-MCNC: 0.7 MG/DL
BUN SERPL-MCNC: 20 MG/DL
CALCIUM SERPL-MCNC: 9.6 MG/DL
CHLORIDE SERPL-SCNC: 105 MMOL/L
CO2 SERPL-SCNC: 23 MMOL/L
CREAT SERPL-MCNC: 0.95 MG/DL
EOSINOPHIL # BLD AUTO: 0.13 K/UL
EOSINOPHIL NFR BLD AUTO: 2.4 %
GLUCOSE SERPL-MCNC: 108 MG/DL
HCT VFR BLD CALC: 44.1 %
HGB BLD-MCNC: 14.4 G/DL
IMM GRANULOCYTES NFR BLD AUTO: 0.2 %
LYMPHOCYTES # BLD AUTO: 1.17 K/UL
LYMPHOCYTES NFR BLD AUTO: 21.3 %
MAN DIFF?: NORMAL
MCHC RBC-ENTMCNC: 31 PG
MCHC RBC-ENTMCNC: 32.7 GM/DL
MCV RBC AUTO: 94.8 FL
MONOCYTES # BLD AUTO: 0.48 K/UL
MONOCYTES NFR BLD AUTO: 8.7 %
NEUTROPHILS # BLD AUTO: 3.66 K/UL
NEUTROPHILS NFR BLD AUTO: 66.7 %
PLATELET # BLD AUTO: 192 K/UL
POTASSIUM SERPL-SCNC: 4.5 MMOL/L
PROT SERPL-MCNC: 7.5 G/DL
RBC # BLD: 4.65 M/UL
RBC # FLD: 13.2 %
SAVE SPECIMEN: NORMAL
SODIUM SERPL-SCNC: 141 MMOL/L
WBC # FLD AUTO: 5.49 K/UL

## 2021-02-03 ENCOUNTER — APPOINTMENT (OUTPATIENT)
Dept: UROLOGY | Facility: CLINIC | Age: 66
End: 2021-02-03
Payer: MEDICARE

## 2021-02-03 VITALS — TEMPERATURE: 97 F

## 2021-02-03 PROCEDURE — 99072 ADDL SUPL MATRL&STAF TM PHE: CPT

## 2021-02-03 PROCEDURE — 99214 OFFICE O/P EST MOD 30 MIN: CPT

## 2021-02-24 ENCOUNTER — NON-APPOINTMENT (OUTPATIENT)
Age: 66
End: 2021-02-24

## 2021-03-02 ENCOUNTER — APPOINTMENT (OUTPATIENT)
Dept: UROLOGY | Facility: CLINIC | Age: 66
End: 2021-03-02
Payer: MEDICARE

## 2021-03-02 PROCEDURE — 99442: CPT

## 2021-03-02 NOTE — ASSESSMENT
[FreeTextEntry1] : We spoke about diet, likelier earlier causes given current diet excellent, and future risks of stone, and again, options and timing to manage.\par \par cont allopurinol- urinary UA is optimal, ~ 400 both days.\par \par f/u as planned in approx 2 months with new renal US to assess for growth, new hydro.\par

## 2021-03-02 NOTE — HISTORY OF PRESENT ILLNESS
[Home] : at home, [unfilled] , at the time of the visit. [Other Location: e.g. Home (Enter Location, City,State)___] : at [unfilled] [Verbal consent obtained from patient] : the patient, [unfilled] [FreeTextEntry1] : The patient-doctor relationship has been established in a face to face fashion via real time video/audio HIPAA compliant communication using telemedicine software. The patient's identity has been confirmed. The patient was previously emailed a copy of the telemedicine consent. They have had a chance to review and has now given verbal consent and has requested care to be assessed and treated via telemedicine. They understand there may be limitations in this process, and that they may need further followup care in the office and/or hospital settings.\par \par Verbal consent given on Mar  2 2021 12:10PM\par \par GUERO GUSTAFSON is a 66 year M who presents for f/u recent 24 hour urine collection in setting of bilateral stone disease. Currently, as reviewed with pt at length, diet/metabolic factors are very good for low risk. Volume could improve (was 1.8 liters both days),but all else currently excellent. Notation that pt has been on allopurinol for > 10 years. \par \par CT re-reviewed; ~1.4 cm stone left lower pole with density mean 1450 HU, peak 1520 HU. SSD> 10 cm.\par \par 03/02/2021 \par \par The patient denies fevers, chills, nausea and/or vomiting, and no unexplained weight loss.\par \par All pertinent parts of the patient PFSH (past medical, family, and social histories), laboratory, radiological studies and available physician notes were reviewed prior to starting the face-to-face portion of the telemedicine visit. Questionnaire results, where appropriate, were discussed with the patient.\par

## 2021-03-23 ENCOUNTER — APPOINTMENT (OUTPATIENT)
Dept: INTERNAL MEDICINE | Facility: CLINIC | Age: 66
End: 2021-03-23

## 2021-04-23 ENCOUNTER — APPOINTMENT (OUTPATIENT)
Dept: DERMATOLOGY | Facility: CLINIC | Age: 66
End: 2021-04-23
Payer: MEDICARE

## 2021-04-23 ENCOUNTER — LABORATORY RESULT (OUTPATIENT)
Age: 66
End: 2021-04-23

## 2021-04-23 ENCOUNTER — NON-APPOINTMENT (OUTPATIENT)
Age: 66
End: 2021-04-23

## 2021-04-23 VITALS — WEIGHT: 190 LBS | HEIGHT: 68 IN | BODY MASS INDEX: 28.79 KG/M2

## 2021-04-23 DIAGNOSIS — D48.5 NEOPLASM OF UNCERTAIN BEHAVIOR OF SKIN: ICD-10-CM

## 2021-04-23 DIAGNOSIS — D22.9 MELANOCYTIC NEVI, UNSPECIFIED: ICD-10-CM

## 2021-04-23 DIAGNOSIS — R23.8 OTHER SKIN CHANGES: ICD-10-CM

## 2021-04-23 PROCEDURE — 11102 TANGNTL BX SKIN SINGLE LES: CPT

## 2021-04-23 PROCEDURE — 99203 OFFICE O/P NEW LOW 30 MIN: CPT | Mod: 25

## 2021-04-23 PROCEDURE — 99072 ADDL SUPL MATRL&STAF TM PHE: CPT

## 2021-04-26 PROBLEM — D48.5 NEOPLASM OF UNCERTAIN BEHAVIOR OF SKIN: Status: ACTIVE | Noted: 2021-04-26

## 2021-04-26 PROBLEM — D22.9 ACQUIRED MELANOCYTIC NEVUS: Status: ACTIVE | Noted: 2021-04-26

## 2021-04-26 PROBLEM — R23.8 PAPULE OF SKIN: Status: ACTIVE | Noted: 2021-04-26

## 2021-04-29 ENCOUNTER — NON-APPOINTMENT (OUTPATIENT)
Age: 66
End: 2021-04-29

## 2021-05-05 ENCOUNTER — APPOINTMENT (OUTPATIENT)
Dept: UROLOGY | Facility: CLINIC | Age: 66
End: 2021-05-05
Payer: MEDICARE

## 2021-05-05 ENCOUNTER — OUTPATIENT (OUTPATIENT)
Dept: OUTPATIENT SERVICES | Facility: HOSPITAL | Age: 66
LOS: 1 days | End: 2021-05-05
Payer: MEDICARE

## 2021-05-05 DIAGNOSIS — R97.20 ELEVATED PROSTATE, SPECIFIC ANTIGEN [PSA]: ICD-10-CM

## 2021-05-05 DIAGNOSIS — R97.20 ELEVATED PROSTATE SPECIFIC ANTIGEN [PSA]: ICD-10-CM

## 2021-05-05 DIAGNOSIS — R35.0 FREQUENCY OF MICTURITION: ICD-10-CM

## 2021-05-05 DIAGNOSIS — N20.0 CALCULUS OF KIDNEY: ICD-10-CM

## 2021-05-05 PROCEDURE — 99213 OFFICE O/P EST LOW 20 MIN: CPT | Mod: 25

## 2021-05-05 PROCEDURE — 76775 US EXAM ABDO BACK WALL LIM: CPT

## 2021-05-05 PROCEDURE — 99072 ADDL SUPL MATRL&STAF TM PHE: CPT

## 2021-05-05 PROCEDURE — 76775 US EXAM ABDO BACK WALL LIM: CPT | Mod: 26

## 2021-05-05 NOTE — HISTORY OF PRESENT ILLNESS
[FreeTextEntry1] : Pt is 65 yo male referred by Dr. Fernando for eval/management of stone. Had h/o left renal colic in 10/2020, and may have had similar, earlier right side renal colic prior to that but undiagnosed. 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 [None] : no symptoms

## 2021-05-05 NOTE — ASSESSMENT
[FreeTextEntry1] : CT scan reviewed- 1. 3 cm dense stone left lower pole, approx 1336 hu (mean), 1465 HU (peak). ~ 5 mm right mid stone.   at that time, also with left ureteral stone, appears to have passed since, and f/u renal US showed no hydro, prior stones still present.\par \par I had long discussion with patient about their stones, and about options, risks, and benefits of all treatments.  Main options for definitive stone treatment include ESWL, URS, PCNL.  \par \par ESWL success best with smaller, less dense stones, and with short skin to stone distance and favorable location of the stone within the urinary tract, while URS is more successful treatment with multiple stones, more dense stones, or challenging body habitus or stone location.  PCNL is best option for larger, more dense and complex stones, and particularly those involving the lower pole.  Non-definitive stone treatment options for drainage, using either stents or nephrostomy, also reviewed: these are of lower immediate surgical risks, but incur multiple procedures to manage and may have their own complications and effects on quality of life.  Still, nephrostomy or nephroureteral catheter can allow maintenance of urinary system drainage without surgical risks, and management in office with exchanges (avoiding the anesthesia and testing which would be present with bilateral internal stent exchange).\par \par Risks of nontreatment with obstruction can lead to very high rate of renal function loss in stone-bearing kidney over the next months to years.\par \par In this patient's case, I recommend... 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  H/o gout. Will review- can RTC 8-10 weeks with renal US to reassess upper tract, and review diet/metabolic.\par \par Diet modification reviewed at length- increasing fluids (primarily water), citrus is good, and decreasing/moderating salt, animal flesh protein, oxalate containing foods, and moderation of calcium intake (1000 mg/day is USRDA).\par \par I reviewed with the patient the risks of metabolic stone disease given their underlying risk parameters (all of which include large stones, multiple stones, bilateral stones, family history, and young age), and the indications for 24 hour urine metabolic assessment.\par \par We also discussed benefits of regular exercise and weight loss as independent risk reducers for stones.\par \par

## 2021-05-05 NOTE — PHYSICAL EXAM
[General Appearance - Well Developed] : well developed [Normal Appearance] : normal appearance [General Appearance - Well Nourished] : well nourished [Well Groomed] : well groomed [General Appearance - In No Acute Distress] : no acute distress [Abdomen Soft] : soft [Abdomen Tenderness] : non-tender [Costovertebral Angle Tenderness] : no ~M costovertebral angle tenderness [] : no respiratory distress [Respiration, Rhythm And Depth] : normal respiratory rhythm and effort [Exaggerated Use Of Accessory Muscles For Inspiration] : no accessory muscle use [Oriented To Time, Place, And Person] : oriented to person, place, and time [Affect] : the affect was normal [Mood] : the mood was normal [Not Anxious] : not anxious [Normal Station and Gait] : the gait and station were normal for the patient's age [No Focal Deficits] : no focal deficits

## 2021-05-05 NOTE — ASSESSMENT
[FreeTextEntry1] : Renal US today reviewed- prior right renal stone not visualized today. 1.3 cm left lower pole renal stone with shadowing, no sig change. Small left upper pole renal cyst appears simple. No solid renal mass.\par \par Doing well\par Pt without hydro, sx- prefers to cont to f/u\par we discussed surgical interventions again, most likely to benefit from left PCNL given size/density/location\par RTC 6 months with renal US

## 2021-05-05 NOTE — HISTORY OF PRESENT ILLNESS
[None] : no symptoms [FreeTextEntry1] : Pt is 65 yo male referred by Dr. Fernando for eval/management of stone. Had h/o left renal colic in 10/2020, and may have had similar, earlier right side renal colic prior to that but undiagnosed. 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

## 2021-05-06 ENCOUNTER — NON-APPOINTMENT (OUTPATIENT)
Age: 66
End: 2021-05-06

## 2021-11-02 ENCOUNTER — APPOINTMENT (OUTPATIENT)
Dept: INTERNAL MEDICINE | Facility: CLINIC | Age: 66
End: 2021-11-02
Payer: MEDICARE

## 2021-11-02 VITALS
HEIGHT: 68 IN | SYSTOLIC BLOOD PRESSURE: 142 MMHG | DIASTOLIC BLOOD PRESSURE: 90 MMHG | HEART RATE: 74 BPM | BODY MASS INDEX: 29.4 KG/M2 | OXYGEN SATURATION: 98 % | WEIGHT: 194 LBS | TEMPERATURE: 97.5 F

## 2021-11-02 DIAGNOSIS — E55.9 VITAMIN D DEFICIENCY, UNSPECIFIED: ICD-10-CM

## 2021-11-02 DIAGNOSIS — H92.09 OTALGIA, UNSPECIFIED EAR: ICD-10-CM

## 2021-11-02 PROCEDURE — 93000 ELECTROCARDIOGRAM COMPLETE: CPT

## 2021-11-02 PROCEDURE — 99214 OFFICE O/P EST MOD 30 MIN: CPT | Mod: 25

## 2021-11-02 PROCEDURE — G0439: CPT

## 2021-11-02 PROCEDURE — 36415 COLL VENOUS BLD VENIPUNCTURE: CPT

## 2021-11-03 LAB
25(OH)D3 SERPL-MCNC: 42.8 NG/ML
ALBUMIN SERPL ELPH-MCNC: 4.6 G/DL
ALP BLD-CCNC: 70 U/L
ALT SERPL-CCNC: 17 U/L
ANION GAP SERPL CALC-SCNC: 13 MMOL/L
APPEARANCE: CLEAR
AST SERPL-CCNC: 16 U/L
BASOPHILS # BLD AUTO: 0.04 K/UL
BASOPHILS NFR BLD AUTO: 0.8 %
BILIRUB SERPL-MCNC: 0.7 MG/DL
BILIRUBIN URINE: NEGATIVE
BLOOD URINE: NORMAL
BUN SERPL-MCNC: 12 MG/DL
CALCIUM SERPL-MCNC: 9.4 MG/DL
CHLORIDE SERPL-SCNC: 105 MMOL/L
CHOLEST SERPL-MCNC: 176 MG/DL
CO2 SERPL-SCNC: 21 MMOL/L
COLOR: NORMAL
CREAT SERPL-MCNC: 1.05 MG/DL
EOSINOPHIL # BLD AUTO: 0.17 K/UL
EOSINOPHIL NFR BLD AUTO: 3.6 %
GLUCOSE QUALITATIVE U: NEGATIVE
GLUCOSE SERPL-MCNC: 105 MG/DL
HCT VFR BLD CALC: 46.1 %
HDLC SERPL-MCNC: 46 MG/DL
HGB BLD-MCNC: 14.9 G/DL
IMM GRANULOCYTES NFR BLD AUTO: 0.2 %
KETONES URINE: NEGATIVE
LDLC SERPL CALC-MCNC: 107 MG/DL
LDLC SERPL DIRECT ASSAY-MCNC: 109 MG/DL
LEUKOCYTE ESTERASE URINE: NEGATIVE
LYMPHOCYTES # BLD AUTO: 1.36 K/UL
LYMPHOCYTES NFR BLD AUTO: 28.8 %
MAN DIFF?: NORMAL
MCHC RBC-ENTMCNC: 31.6 PG
MCHC RBC-ENTMCNC: 32.3 GM/DL
MCV RBC AUTO: 97.9 FL
MONOCYTES # BLD AUTO: 0.46 K/UL
MONOCYTES NFR BLD AUTO: 9.7 %
NEUTROPHILS # BLD AUTO: 2.69 K/UL
NEUTROPHILS NFR BLD AUTO: 56.9 %
NITRITE URINE: NEGATIVE
NONHDLC SERPL-MCNC: 130 MG/DL
PH URINE: 5.5
PLATELET # BLD AUTO: 190 K/UL
POTASSIUM SERPL-SCNC: 4 MMOL/L
PROT SERPL-MCNC: 7.2 G/DL
PROTEIN URINE: NEGATIVE
PSA SERPL-MCNC: 3.59 NG/ML
RBC # BLD: 4.71 M/UL
RBC # FLD: 13.2 %
SODIUM SERPL-SCNC: 140 MMOL/L
SPECIFIC GRAVITY URINE: 1.01
T4 FREE SERPL-MCNC: 1.3 NG/DL
TRIGL SERPL-MCNC: 116 MG/DL
TSH SERPL-ACNC: 3.1 UIU/ML
URATE SERPL-MCNC: 5.4 MG/DL
UROBILINOGEN URINE: NORMAL
WBC # FLD AUTO: 4.73 K/UL

## 2021-11-24 ENCOUNTER — APPOINTMENT (OUTPATIENT)
Dept: UROLOGY | Facility: CLINIC | Age: 66
End: 2021-11-24
Payer: MEDICARE

## 2021-11-24 ENCOUNTER — OUTPATIENT (OUTPATIENT)
Dept: OUTPATIENT SERVICES | Facility: HOSPITAL | Age: 66
LOS: 1 days | End: 2021-11-24
Payer: MEDICARE

## 2021-11-24 VITALS — DIASTOLIC BLOOD PRESSURE: 88 MMHG | SYSTOLIC BLOOD PRESSURE: 152 MMHG

## 2021-11-24 DIAGNOSIS — N20.0 CALCULUS OF KIDNEY: ICD-10-CM

## 2021-11-24 DIAGNOSIS — N40.0 BENIGN PROSTATIC HYPERPLASIA WITHOUT LOWER URINARY TRACT SYMPTOMS: ICD-10-CM

## 2021-11-24 DIAGNOSIS — R35.0 FREQUENCY OF MICTURITION: ICD-10-CM

## 2021-11-24 PROCEDURE — 76775 US EXAM ABDO BACK WALL LIM: CPT | Mod: 26

## 2021-11-24 PROCEDURE — 99214 OFFICE O/P EST MOD 30 MIN: CPT | Mod: 25

## 2021-11-24 PROCEDURE — 76775 US EXAM ABDO BACK WALL LIM: CPT

## 2021-11-24 NOTE — PHYSICAL EXAM
[General Appearance - Well Developed] : well developed [General Appearance - Well Nourished] : well nourished [Normal Appearance] : normal appearance [Well Groomed] : well groomed [General Appearance - In No Acute Distress] : no acute distress [Abdomen Soft] : soft [Abdomen Tenderness] : non-tender [Costovertebral Angle Tenderness] : no ~M costovertebral angle tenderness [Urethral Meatus] : meatus normal [Urinary Bladder Findings] : the bladder was normal on palpation [Scrotum] : the scrotum was normal [Epididymis] : the epididymides were normal [Testes Mass (___cm)] : there were no testicular masses [No Prostate Nodules] : no prostate nodules [Prostate Size ___ (0-4)] : prostate size [unfilled] (scale: 0-4) [Edema] : no peripheral edema [] : no respiratory distress [Respiration, Rhythm And Depth] : normal respiratory rhythm and effort [Exaggerated Use Of Accessory Muscles For Inspiration] : no accessory muscle use [Oriented To Time, Place, And Person] : oriented to person, place, and time [Affect] : the affect was normal [Mood] : the mood was normal [Not Anxious] : not anxious [Normal Station and Gait] : the gait and station were normal for the patient's age [No Focal Deficits] : no focal deficits

## 2021-11-24 NOTE — HISTORY OF PRESENT ILLNESS
[FreeTextEntry1] : Pt is 67 yo male f/u for eval/management of stone. Had h/o left renal colic in 10/2020, and may have had similar, earlier right side renal colic prior to that but undiagnosed. No new changes- feeling well. No pain episodes. 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

## 2021-11-24 NOTE — ASSESSMENT
[FreeTextEntry1] : Renal US today reviewed- prior right renal stone not visualized today again. 1.0 cm left lower pole renal stone with shadowing, no sig change. Small left upper pole renal cyst appears simple. No solid renal mass. No hydronephrosis.\par \par Doing well\par Pt without hydro, sx- prefers to cont to f/u\par we discussed surgical interventions again, most likely to benefit from left PCNL given size/density/location\par RTC 6 months with renal US\par check PSA and u/a with micro today

## 2021-12-01 LAB
APPEARANCE: CLEAR
BACTERIA: NEGATIVE
BILIRUBIN URINE: NEGATIVE
BLOOD URINE: NORMAL
COLOR: NORMAL
GLUCOSE QUALITATIVE U: NEGATIVE
HYALINE CASTS: 0 /LPF
KETONES URINE: NEGATIVE
LEUKOCYTE ESTERASE URINE: NEGATIVE
MICROSCOPIC-UA: NORMAL
NITRITE URINE: NEGATIVE
PH URINE: 5.5
PROTEIN URINE: NEGATIVE
PSA SERPL-MCNC: 3.76 NG/ML
RED BLOOD CELLS URINE: 3 /HPF
SPECIFIC GRAVITY URINE: 1.02
SQUAMOUS EPITHELIAL CELLS: 0 /HPF
UROBILINOGEN URINE: NORMAL
WHITE BLOOD CELLS URINE: 0 /HPF

## 2021-12-02 ENCOUNTER — APPOINTMENT (OUTPATIENT)
Dept: CARDIOLOGY | Facility: CLINIC | Age: 66
End: 2021-12-02
Payer: MEDICARE

## 2021-12-02 PROCEDURE — ZZZZZ: CPT

## 2021-12-07 ENCOUNTER — NON-APPOINTMENT (OUTPATIENT)
Age: 66
End: 2021-12-07

## 2022-03-19 ENCOUNTER — NON-APPOINTMENT (OUTPATIENT)
Age: 67
End: 2022-03-19

## 2022-03-19 ENCOUNTER — APPOINTMENT (OUTPATIENT)
Dept: INTERNAL MEDICINE | Facility: CLINIC | Age: 67
End: 2022-03-19
Payer: MEDICARE

## 2022-03-19 DIAGNOSIS — R42 DIZZINESS AND GIDDINESS: ICD-10-CM

## 2022-03-19 PROCEDURE — 99442: CPT

## 2022-03-19 RX ORDER — MECLIZINE HYDROCHLORIDE 25 MG/1
25 TABLET ORAL 3 TIMES DAILY
Qty: 30 | Refills: 0 | Status: ACTIVE | COMMUNITY
Start: 2022-03-19 | End: 1900-01-01

## 2022-05-25 ENCOUNTER — APPOINTMENT (OUTPATIENT)
Dept: UROLOGY | Facility: CLINIC | Age: 67
End: 2022-05-25
Payer: MEDICARE

## 2022-05-25 ENCOUNTER — OUTPATIENT (OUTPATIENT)
Dept: OUTPATIENT SERVICES | Facility: HOSPITAL | Age: 67
LOS: 1 days | End: 2022-05-25
Payer: COMMERCIAL

## 2022-05-25 DIAGNOSIS — N20.0 CALCULUS OF KIDNEY: ICD-10-CM

## 2022-05-25 DIAGNOSIS — N40.0 BENIGN PROSTATIC HYPERPLASIA WITHOUT LOWER URINARY TRACT SYMPTOMS: ICD-10-CM

## 2022-05-25 DIAGNOSIS — R35.0 FREQUENCY OF MICTURITION: ICD-10-CM

## 2022-05-25 PROCEDURE — 99214 OFFICE O/P EST MOD 30 MIN: CPT | Mod: 25

## 2022-05-25 PROCEDURE — 76775 US EXAM ABDO BACK WALL LIM: CPT | Mod: 26

## 2022-05-25 PROCEDURE — 76775 US EXAM ABDO BACK WALL LIM: CPT

## 2022-05-25 NOTE — ASSESSMENT
[FreeTextEntry1] : Renal US reviewed: No stone seen on right, in comparison to both last films 11/2021 and prior CT scan. Stone left lower, with shadowing, ~ 1 cm without sig growth or change. no hydronephrosis. simple appearing cyst left upper pole. No solid renal mass. Overall stable, excellent.\par \par Doing well at this time.\par \par Stone disease stable\par \par RTC 6 months with renal US, full exam, and f/u of PSA either with us, or pcp

## 2022-05-25 NOTE — HISTORY OF PRESENT ILLNESS
[None] : no symptoms [FreeTextEntry1] : Pt is 68 yo male f/u for eval/management of stone. Had h/o left renal colic in 10/2020, and may have had similar, earlier right side renal colic prior to that but undiagnosed. No new changes- feeling well. No pain episodes. 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

## 2022-07-28 RX ORDER — ASPIRIN 81 MG
6.5 TABLET, DELAYED RELEASE (ENTERIC COATED) ORAL
Qty: 2 | Refills: 0 | Status: ACTIVE | COMMUNITY
Start: 2022-07-28 | End: 1900-01-01

## 2022-08-05 ENCOUNTER — NON-APPOINTMENT (OUTPATIENT)
Age: 67
End: 2022-08-05

## 2022-08-12 ENCOUNTER — APPOINTMENT (OUTPATIENT)
Dept: INTERNAL MEDICINE | Facility: CLINIC | Age: 67
End: 2022-08-12

## 2022-08-12 VITALS
TEMPERATURE: 97.1 F | DIASTOLIC BLOOD PRESSURE: 88 MMHG | HEIGHT: 68 IN | WEIGHT: 194 LBS | OXYGEN SATURATION: 96 % | SYSTOLIC BLOOD PRESSURE: 134 MMHG | HEART RATE: 84 BPM | BODY MASS INDEX: 29.4 KG/M2

## 2022-08-12 VITALS — SYSTOLIC BLOOD PRESSURE: 120 MMHG | DIASTOLIC BLOOD PRESSURE: 82 MMHG

## 2022-08-12 DIAGNOSIS — H61.20 IMPACTED CERUMEN, UNSPECIFIED EAR: ICD-10-CM

## 2022-08-12 PROCEDURE — 99213 OFFICE O/P EST LOW 20 MIN: CPT

## 2022-08-12 NOTE — HISTORY OF PRESENT ILLNESS
[FreeTextEntry8] : CC: ear clogged\par pt states he saw the [facility] last Sat - was tx for R ear infection - tx w HC/ sulf/ polymyxin.\par pt states R ear still feels clogged .  no ear pain.  has tinnitus.\par has appt w ENT appt in 2 wk- but given debrox by ENT\par no fever or discharge

## 2022-08-17 ENCOUNTER — APPOINTMENT (OUTPATIENT)
Dept: OTOLARYNGOLOGY | Facility: CLINIC | Age: 67
End: 2022-08-17

## 2022-08-17 VITALS
BODY MASS INDEX: 29.4 KG/M2 | WEIGHT: 194 LBS | HEIGHT: 68 IN | TEMPERATURE: 97.6 F | HEART RATE: 78 BPM | DIASTOLIC BLOOD PRESSURE: 81 MMHG | SYSTOLIC BLOOD PRESSURE: 132 MMHG

## 2022-08-17 DIAGNOSIS — H61.23 IMPACTED CERUMEN, BILATERAL: ICD-10-CM

## 2022-08-17 DIAGNOSIS — H93.8X1 OTHER SPECIFIED DISORDERS OF RIGHT EAR: ICD-10-CM

## 2022-08-17 DIAGNOSIS — H93.291 OTHER ABNORMAL AUDITORY PERCEPTIONS, RIGHT EAR: ICD-10-CM

## 2022-08-17 DIAGNOSIS — L29.9 PRURITUS, UNSPECIFIED: ICD-10-CM

## 2022-08-17 PROCEDURE — 92567 TYMPANOMETRY: CPT

## 2022-08-17 PROCEDURE — 99203 OFFICE O/P NEW LOW 30 MIN: CPT | Mod: 25

## 2022-08-17 PROCEDURE — G0268 REMOVAL OF IMPACTED WAX MD: CPT

## 2022-08-17 PROCEDURE — 92557 COMPREHENSIVE HEARING TEST: CPT

## 2022-08-17 RX ORDER — MOMETASONE FUROATE 1 MG/ML
0.1 SOLUTION TOPICAL
Qty: 1 | Refills: 3 | Status: ACTIVE | COMMUNITY
Start: 2022-08-17 | End: 1900-01-01

## 2022-08-17 NOTE — PROCEDURE
[FreeTextEntry3] : Procedure - Cerumen Removal. \par After informed verbal consent is obtained, cerumen is removed from the b/l ear canal with a curette, peroxide and suction.  Normal appearing canal following removal.\par

## 2022-08-17 NOTE — PHYSICAL EXAM
[Midline] : trachea located in midline position [Normal] : no rashes [de-identified] : b/l cerumen impaction

## 2022-08-17 NOTE — HISTORY OF PRESENT ILLNESS
[de-identified] : 68 y/o M with hearing in right ear that was initially intermittent then worsened.  Also with tinnitus b/l.  Now constant with some pressure.  Has used Debrox.  Went to Urgent care 2 weeks ago and was given hydrocortisone without improvement.

## 2022-08-17 NOTE — ASSESSMENT
[FreeTextEntry1] : Abnormal auditory perception Right, with tinnitus and cerumen impaction b/l.\par - Cerumen Removed b/l\par - Audiogram today WNL\par - yearly cerumen eval\par \par Ear itching:\par - Mometasone gtts. \par \par \par I personally saw and examined Mr. GUERO GUSTAFSON in detail this visit today. I personally reviewed the HPI, PMH, FH, SH, ROS and medications/allergies. I have spoken to BRYAN Yao regarding the history and have personally determined the assessment and plan of care, and documented this myself. I was present and participated in all key portions of the encounter and all procedures noted above. I have made changes in the body of the note where appropriate.\par \par Attesting Faculty: See Attending Signature Below

## 2022-08-17 NOTE — CONSULT LETTER
[Dear  ___] : Dear  [unfilled], [Consult Letter:] : I had the pleasure of evaluating your patient, [unfilled]. [Please see my note below.] : Please see my note below. [Consult Closing:] : Thank you very much for allowing me to participate in the care of this patient.  If you have any questions, please do not hesitate to contact me. [Sincerely,] : Sincerely, [FreeTextEntry3] : Felipa Davenport MD\par Otolaryngology and Cranial Base Surgery\par Attending Physician - Department of Otolaryngology and Head & Neck Surgery\par [facility]\par  - [employer] [facility]\par Office: [phone]\par Fax: [phone]\par

## 2022-08-24 ENCOUNTER — APPOINTMENT (OUTPATIENT)
Dept: OTOLARYNGOLOGY | Facility: CLINIC | Age: 67
End: 2022-08-24

## 2022-11-09 ENCOUNTER — APPOINTMENT (OUTPATIENT)
Dept: INTERNAL MEDICINE | Facility: CLINIC | Age: 67
End: 2022-11-09

## 2022-11-09 VITALS
SYSTOLIC BLOOD PRESSURE: 132 MMHG | DIASTOLIC BLOOD PRESSURE: 80 MMHG | BODY MASS INDEX: 30.01 KG/M2 | WEIGHT: 198 LBS | HEART RATE: 74 BPM | OXYGEN SATURATION: 98 % | HEIGHT: 68 IN | TEMPERATURE: 98.1 F

## 2022-11-09 DIAGNOSIS — M10.9 GOUT, UNSPECIFIED: ICD-10-CM

## 2022-11-09 DIAGNOSIS — R03.0 ELEVATED BLOOD-PRESSURE READING, W/OUT DIAGNOSIS OF HYPERTENSION: ICD-10-CM

## 2022-11-09 PROCEDURE — G0439: CPT

## 2022-11-09 PROCEDURE — 99214 OFFICE O/P EST MOD 30 MIN: CPT | Mod: 25

## 2022-11-09 PROCEDURE — 93000 ELECTROCARDIOGRAM COMPLETE: CPT

## 2022-11-09 RX ORDER — TAMSULOSIN HYDROCHLORIDE 0.4 MG/1
0.4 CAPSULE ORAL
Qty: 30 | Refills: 0 | Status: DISCONTINUED | COMMUNITY
Start: 2020-10-13 | End: 2022-11-09

## 2022-11-10 LAB
24R-OH-CALCIDIOL SERPL-MCNC: 85.2 PG/ML
ALBUMIN SERPL ELPH-MCNC: 4.6 G/DL
ALP BLD-CCNC: 70 U/L
ALT SERPL-CCNC: 16 U/L
ANION GAP SERPL CALC-SCNC: 13 MMOL/L
APPEARANCE: CLEAR
AST SERPL-CCNC: 21 U/L
BASOPHILS # BLD AUTO: 0.04 K/UL
BASOPHILS NFR BLD AUTO: 0.8 %
BILIRUB SERPL-MCNC: 0.7 MG/DL
BILIRUBIN URINE: NEGATIVE
BLOOD URINE: NEGATIVE
BUN SERPL-MCNC: 19 MG/DL
CALCIUM SERPL-MCNC: 9.5 MG/DL
CHLORIDE SERPL-SCNC: 103 MMOL/L
CHOLEST SERPL-MCNC: 179 MG/DL
CO2 SERPL-SCNC: 22 MMOL/L
COLOR: NORMAL
CREAT SERPL-MCNC: 1.03 MG/DL
EGFR: 79 ML/MIN/1.73M2
EOSINOPHIL # BLD AUTO: 0.09 K/UL
EOSINOPHIL NFR BLD AUTO: 1.8 %
GLUCOSE QUALITATIVE U: NEGATIVE
GLUCOSE SERPL-MCNC: 102 MG/DL
HCT VFR BLD CALC: 45.4 %
HDLC SERPL-MCNC: 47 MG/DL
HGB BLD-MCNC: 14.8 G/DL
IMM GRANULOCYTES NFR BLD AUTO: 0.2 %
KETONES URINE: NEGATIVE
LDLC SERPL CALC-MCNC: 115 MG/DL
LDLC SERPL DIRECT ASSAY-MCNC: 120 MG/DL
LEUKOCYTE ESTERASE URINE: NEGATIVE
LYMPHOCYTES # BLD AUTO: 1.27 K/UL
LYMPHOCYTES NFR BLD AUTO: 25.2 %
MAN DIFF?: NORMAL
MCHC RBC-ENTMCNC: 31.4 PG
MCHC RBC-ENTMCNC: 32.6 GM/DL
MCV RBC AUTO: 96.4 FL
MONOCYTES # BLD AUTO: 0.4 K/UL
MONOCYTES NFR BLD AUTO: 7.9 %
NEUTROPHILS # BLD AUTO: 3.23 K/UL
NEUTROPHILS NFR BLD AUTO: 64.1 %
NITRITE URINE: NEGATIVE
NONHDLC SERPL-MCNC: 133 MG/DL
PH URINE: 5.5
PLATELET # BLD AUTO: 186 K/UL
POTASSIUM SERPL-SCNC: 4.5 MMOL/L
PROT SERPL-MCNC: 7.9 G/DL
PROTEIN URINE: NEGATIVE
PSA SERPL-MCNC: 3.35 NG/ML
RBC # BLD: 4.71 M/UL
RBC # FLD: 13.5 %
SODIUM SERPL-SCNC: 138 MMOL/L
SPECIFIC GRAVITY URINE: 1.02
T4 FREE SERPL-MCNC: 1.4 NG/DL
TRIGL SERPL-MCNC: 87 MG/DL
TSH SERPL-ACNC: 2.08 UIU/ML
URATE SERPL-MCNC: 5.5 MG/DL
UROBILINOGEN URINE: NORMAL
WBC # FLD AUTO: 5.04 K/UL

## 2022-11-28 LAB — NONINV COLON CA DNA+OCC BLD SCRN STL QL: NEGATIVE

## 2022-12-07 ENCOUNTER — OUTPATIENT (OUTPATIENT)
Dept: OUTPATIENT SERVICES | Facility: HOSPITAL | Age: 67
LOS: 1 days | End: 2022-12-07
Payer: MEDICARE

## 2022-12-07 ENCOUNTER — APPOINTMENT (OUTPATIENT)
Dept: UROLOGY | Facility: CLINIC | Age: 67
End: 2022-12-07

## 2022-12-07 DIAGNOSIS — R35.0 FREQUENCY OF MICTURITION: ICD-10-CM

## 2022-12-07 PROCEDURE — 99214 OFFICE O/P EST MOD 30 MIN: CPT | Mod: 25

## 2022-12-07 PROCEDURE — 76775 US EXAM ABDO BACK WALL LIM: CPT

## 2022-12-07 PROCEDURE — 76775 US EXAM ABDO BACK WALL LIM: CPT | Mod: 26

## 2022-12-07 NOTE — ASSESSMENT
[FreeTextEntry1] : Renal US reviewed: Right kidney 3 mm, same in comparison CT scan. Stone left lower, with shadowing, ~ 1 cm without sig growth or change. no hydronephrosis. simple appearing cyst left upper pole. No solid renal mass. Overall stable, excellent.\par \par Doing well at this time.\par \par Stone disease stable- will move to yearly. JACI okay today, and psa wnl\par \par RTC 12 months with renal US, full exam, and f/u of PSA either with us, or pcp

## 2022-12-07 NOTE — PHYSICAL EXAM
[General Appearance - Well Developed] : well developed [General Appearance - Well Nourished] : well nourished [Normal Appearance] : normal appearance [Well Groomed] : well groomed [General Appearance - In No Acute Distress] : no acute distress [Abdomen Soft] : soft [Abdomen Tenderness] : non-tender [Costovertebral Angle Tenderness] : no ~M costovertebral angle tenderness [Edema] : no peripheral edema [] : no respiratory distress [Respiration, Rhythm And Depth] : normal respiratory rhythm and effort [Exaggerated Use Of Accessory Muscles For Inspiration] : no accessory muscle use [Oriented To Time, Place, And Person] : oriented to person, place, and time [Affect] : the affect was normal [Mood] : the mood was normal [Not Anxious] : not anxious [Normal Station and Gait] : the gait and station were normal for the patient's age [No Focal Deficits] : no focal deficits [Urethral Meatus] : meatus normal [Penis Abnormality] : normal circumcised penis [Urinary Bladder Findings] : the bladder was normal on palpation [Scrotum] : the scrotum was normal [Epididymis] : the epididymides were normal [Testes Mass (___cm)] : there were no testicular masses [No Prostate Nodules] : no prostate nodules [Prostate Size ___ (0-4)] : prostate size [unfilled] (scale: 0-4)

## 2022-12-07 NOTE — HISTORY OF PRESENT ILLNESS
[None] : no symptoms [FreeTextEntry1] : Pt is 66 yo male f/u for eval/management of stone. Had h/o left renal colic in 10/2020, and may have had similar, earlier right side renal colic prior to that but undiagnosed. No new changes- feeling well. No pain episodes. 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

## 2022-12-08 DIAGNOSIS — N20.0 CALCULUS OF KIDNEY: ICD-10-CM

## 2022-12-08 DIAGNOSIS — N40.0 BENIGN PROSTATIC HYPERPLASIA WITHOUT LOWER URINARY TRACT SYMPTOMS: ICD-10-CM

## 2023-01-04 ENCOUNTER — APPOINTMENT (OUTPATIENT)
Dept: INTERNAL MEDICINE | Facility: CLINIC | Age: 68
End: 2023-01-04
Payer: MEDICARE

## 2023-01-04 ENCOUNTER — NON-APPOINTMENT (OUTPATIENT)
Age: 68
End: 2023-01-04

## 2023-01-04 VITALS
SYSTOLIC BLOOD PRESSURE: 144 MMHG | BODY MASS INDEX: 30.31 KG/M2 | WEIGHT: 200 LBS | HEART RATE: 80 BPM | TEMPERATURE: 97.4 F | HEIGHT: 68 IN | DIASTOLIC BLOOD PRESSURE: 84 MMHG | OXYGEN SATURATION: 96 %

## 2023-01-04 DIAGNOSIS — T14.8XXA OTHER INJURY OF UNSPECIFIED BODY REGION, INITIAL ENCOUNTER: ICD-10-CM

## 2023-01-04 DIAGNOSIS — N64.4 MASTODYNIA: ICD-10-CM

## 2023-01-04 PROCEDURE — 36415 COLL VENOUS BLD VENIPUNCTURE: CPT

## 2023-01-04 PROCEDURE — 99213 OFFICE O/P EST LOW 20 MIN: CPT | Mod: 25

## 2023-01-04 PROCEDURE — 93000 ELECTROCARDIOGRAM COMPLETE: CPT

## 2023-01-10 LAB
ALBUMIN SERPL ELPH-MCNC: 4.6 G/DL
ALP BLD-CCNC: 72 U/L
ALT SERPL-CCNC: 13 U/L
ANION GAP SERPL CALC-SCNC: 11 MMOL/L
AST SERPL-CCNC: 15 U/L
BASOPHILS # BLD AUTO: 0.03 K/UL
BASOPHILS NFR BLD AUTO: 0.5 %
BILIRUB SERPL-MCNC: 0.7 MG/DL
BUN SERPL-MCNC: 22 MG/DL
CALCIUM SERPL-MCNC: 9.8 MG/DL
CHLORIDE SERPL-SCNC: 104 MMOL/L
CO2 SERPL-SCNC: 24 MMOL/L
CREAT SERPL-MCNC: 1.09 MG/DL
EGFR: 73 ML/MIN/1.73M2
EOSINOPHIL # BLD AUTO: 0.09 K/UL
EOSINOPHIL NFR BLD AUTO: 1.6 %
GLUCOSE SERPL-MCNC: 99 MG/DL
HCT VFR BLD CALC: 44.6 %
HGB BLD-MCNC: 14.7 G/DL
IMM GRANULOCYTES NFR BLD AUTO: 0.5 %
LYMPHOCYTES # BLD AUTO: 1.35 K/UL
LYMPHOCYTES NFR BLD AUTO: 24.3 %
MAN DIFF?: NORMAL
MCHC RBC-ENTMCNC: 31.1 PG
MCHC RBC-ENTMCNC: 33 GM/DL
MCV RBC AUTO: 94.3 FL
MONOCYTES # BLD AUTO: 0.41 K/UL
MONOCYTES NFR BLD AUTO: 7.4 %
NEUTROPHILS # BLD AUTO: 3.64 K/UL
NEUTROPHILS NFR BLD AUTO: 65.7 %
PLATELET # BLD AUTO: 205 K/UL
POTASSIUM SERPL-SCNC: 4.2 MMOL/L
PROT SERPL-MCNC: 7.5 G/DL
RBC # BLD: 4.73 M/UL
RBC # FLD: 12.9 %
SODIUM SERPL-SCNC: 139 MMOL/L
TROPONIN I SERPL-MCNC: <0.01 NG/ML
TROPONIN-T, HIGH SENSITIVITY: 7 NG/L
WBC # FLD AUTO: 5.55 K/UL

## 2023-11-03 ENCOUNTER — OFFICE (OUTPATIENT)
Dept: URBAN - METROPOLITAN AREA CLINIC 90 | Facility: CLINIC | Age: 68
Setting detail: OPHTHALMOLOGY
End: 2023-11-03
Payer: MEDICAID

## 2023-11-03 DIAGNOSIS — H43.811: ICD-10-CM

## 2023-11-03 DIAGNOSIS — H25.13: ICD-10-CM

## 2023-11-03 DIAGNOSIS — H35.40: ICD-10-CM

## 2023-11-03 DIAGNOSIS — H35.371: ICD-10-CM

## 2023-11-03 PROCEDURE — 92004 COMPRE OPH EXAM NEW PT 1/>: CPT | Performed by: OPHTHALMOLOGY

## 2023-11-03 ASSESSMENT — REFRACTION_CURRENTRX
OD_SPHERE: +2.50
OD_VPRISM_DIRECTION: SV
OD_AXIS: 025
OD_CYLINDER: -2.00
OS_SPHERE: -3.25
OD_OVR_VA: 20/
OS_OVR_VA: 20/
OS_VPRISM_DIRECTION: SV
OS_CYLINDER: SPH

## 2023-11-03 ASSESSMENT — REFRACTION_MANIFEST
OS_CYLINDER: SPHERE
OD_AXIS: 25
OD_CYLINDER: -2.00
OS_SPHERE: -3.00
OD_ADD: +2.50
OD_VA1: 20/20
OD_SPHERE: +2.75
OS_ADD: +2.50
OS_VA1: 20/20

## 2023-11-03 ASSESSMENT — REFRACTION_AUTOREFRACTION
OS_SPHERE: -2.00
OD_CYLINDER: -2.00
OS_AXIS: 143
OS_CYLINDER: -1.00
OD_SPHERE: +3.25
OD_AXIS: 027

## 2023-11-03 ASSESSMENT — CONFRONTATIONAL VISUAL FIELD TEST (CVF)
OS_FINDINGS: FULL
OD_FINDINGS: FULL

## 2023-11-03 ASSESSMENT — SPHEQUIV_DERIVED
OD_SPHEQUIV: 1.75
OS_SPHEQUIV: -2.5
OD_SPHEQUIV: 2.25

## 2023-11-09 NOTE — ED ADULT TRIAGE NOTE - AS TEMP SITE
oral Topical Metronidazole Counseling: Metronidazole is a topical antibiotic medication. You may experience burning, stinging, redness, or allergic reactions.  Please call our office if you develop any problems from using this medication.

## 2023-11-16 ENCOUNTER — LABORATORY RESULT (OUTPATIENT)
Age: 68
End: 2023-11-16

## 2023-11-16 ENCOUNTER — APPOINTMENT (OUTPATIENT)
Dept: INTERNAL MEDICINE | Facility: CLINIC | Age: 68
End: 2023-11-16
Payer: MEDICARE

## 2023-11-16 VITALS — SYSTOLIC BLOOD PRESSURE: 138 MMHG | DIASTOLIC BLOOD PRESSURE: 90 MMHG

## 2023-11-16 VITALS
OXYGEN SATURATION: 97 % | DIASTOLIC BLOOD PRESSURE: 88 MMHG | HEART RATE: 69 BPM | HEIGHT: 68 IN | SYSTOLIC BLOOD PRESSURE: 155 MMHG | BODY MASS INDEX: 30.46 KG/M2 | TEMPERATURE: 98.3 F | WEIGHT: 201 LBS

## 2023-11-16 DIAGNOSIS — K22.70 BARRETT'S ESOPHAGUS W/OUT DYSPLASIA: ICD-10-CM

## 2023-11-16 DIAGNOSIS — R07.89 OTHER CHEST PAIN: ICD-10-CM

## 2023-11-16 DIAGNOSIS — Z00.00 ENCOUNTER FOR GENERAL ADULT MEDICAL EXAMINATION W/OUT ABNORMAL FINDINGS: ICD-10-CM

## 2023-11-16 PROCEDURE — 99214 OFFICE O/P EST MOD 30 MIN: CPT | Mod: 25

## 2023-11-16 PROCEDURE — 93000 ELECTROCARDIOGRAM COMPLETE: CPT

## 2023-11-16 PROCEDURE — G0439: CPT

## 2023-11-20 LAB
25(OH)D3 SERPL-MCNC: 30.4 NG/ML
ALBUMIN SERPL ELPH-MCNC: 4.5 G/DL
ALP BLD-CCNC: 66 U/L
ALT SERPL-CCNC: 14 U/L
ANION GAP SERPL CALC-SCNC: 12 MMOL/L
APPEARANCE: CLEAR
AST SERPL-CCNC: 18 U/L
BASOPHILS # BLD AUTO: 0.05 K/UL
BASOPHILS NFR BLD AUTO: 1 %
BILIRUB SERPL-MCNC: 1 MG/DL
BILIRUBIN URINE: NEGATIVE
BLOOD URINE: ABNORMAL
BUN SERPL-MCNC: 16 MG/DL
CALCIUM SERPL-MCNC: 9.6 MG/DL
CHLORIDE SERPL-SCNC: 104 MMOL/L
CHOLEST SERPL-MCNC: 176 MG/DL
CO2 SERPL-SCNC: 23 MMOL/L
COLOR: YELLOW
CREAT SERPL-MCNC: 1.01 MG/DL
EGFR: 80 ML/MIN/1.73M2
EOSINOPHIL # BLD AUTO: 0.08 K/UL
EOSINOPHIL NFR BLD AUTO: 1.6 %
ERYTHROCYTE [SEDIMENTATION RATE] IN BLOOD BY WESTERGREN METHOD: 30 MM/HR
GLUCOSE QUALITATIVE U: NEGATIVE MG/DL
GLUCOSE SERPL-MCNC: 109 MG/DL
HCT VFR BLD CALC: 45 %
HDLC SERPL-MCNC: 52 MG/DL
HGB BLD-MCNC: 14.7 G/DL
IMM GRANULOCYTES NFR BLD AUTO: 0.2 %
KETONES URINE: NEGATIVE MG/DL
LDLC SERPL CALC-MCNC: 101 MG/DL
LEUKOCYTE ESTERASE URINE: NEGATIVE
LYMPHOCYTES # BLD AUTO: 1.1 K/UL
LYMPHOCYTES NFR BLD AUTO: 22.7 %
MAN DIFF?: NORMAL
MCHC RBC-ENTMCNC: 31.5 PG
MCHC RBC-ENTMCNC: 32.7 GM/DL
MCV RBC AUTO: 96.6 FL
MONOCYTES # BLD AUTO: 0.4 K/UL
MONOCYTES NFR BLD AUTO: 8.2 %
NEUTROPHILS # BLD AUTO: 3.21 K/UL
NEUTROPHILS NFR BLD AUTO: 66.3 %
NITRITE URINE: NEGATIVE
NONHDLC SERPL-MCNC: 124 MG/DL
PH URINE: 5.5
PLATELET # BLD AUTO: 193 K/UL
POTASSIUM SERPL-SCNC: 4 MMOL/L
PROT SERPL-MCNC: 7.8 G/DL
PROTEIN URINE: NEGATIVE MG/DL
PSA SERPL-MCNC: 3.46 NG/ML
RBC # BLD: 4.66 M/UL
RBC # FLD: 13.6 %
SODIUM SERPL-SCNC: 140 MMOL/L
SPECIFIC GRAVITY URINE: 1.02
T3 SERPL-MCNC: 111 NG/DL
T3RU NFR SERPL: 1.1 TBI
T4 FREE SERPL-MCNC: 1.4 NG/DL
TRIGL SERPL-MCNC: 129 MG/DL
TSH SERPL-ACNC: 3.39 UIU/ML
UROBILINOGEN URINE: 1 MG/DL
WBC # FLD AUTO: 4.85 K/UL

## 2023-11-21 ENCOUNTER — NON-APPOINTMENT (OUTPATIENT)
Age: 68
End: 2023-11-21

## 2023-11-22 ENCOUNTER — EMERGENCY (EMERGENCY)
Facility: HOSPITAL | Age: 68
LOS: 1 days | Discharge: HOME | End: 2023-11-22
Attending: STUDENT IN AN ORGANIZED HEALTH CARE EDUCATION/TRAINING PROGRAM
Payer: MEDICARE

## 2023-11-22 ENCOUNTER — NON-APPOINTMENT (OUTPATIENT)
Age: 68
End: 2023-11-22

## 2023-11-22 VITALS
OXYGEN SATURATION: 97 % | DIASTOLIC BLOOD PRESSURE: 101 MMHG | HEIGHT: 68.5 IN | TEMPERATURE: 98 F | WEIGHT: 199.96 LBS | SYSTOLIC BLOOD PRESSURE: 153 MMHG | RESPIRATION RATE: 18 BRPM | HEART RATE: 63 BPM

## 2023-11-22 LAB
ALBUMIN SERPL ELPH-MCNC: 4.3 G/DL — SIGNIFICANT CHANGE UP (ref 3.3–5)
ALBUMIN SERPL ELPH-MCNC: 4.3 G/DL — SIGNIFICANT CHANGE UP (ref 3.3–5)
ALP SERPL-CCNC: 73 U/L — SIGNIFICANT CHANGE UP (ref 40–120)
ALP SERPL-CCNC: 73 U/L — SIGNIFICANT CHANGE UP (ref 40–120)
ALT FLD-CCNC: 13 U/L — SIGNIFICANT CHANGE UP (ref 10–45)
ALT FLD-CCNC: 13 U/L — SIGNIFICANT CHANGE UP (ref 10–45)
ANION GAP SERPL CALC-SCNC: 13 MMOL/L — SIGNIFICANT CHANGE UP (ref 5–17)
ANION GAP SERPL CALC-SCNC: 13 MMOL/L — SIGNIFICANT CHANGE UP (ref 5–17)
APPEARANCE UR: CLEAR — SIGNIFICANT CHANGE UP
APPEARANCE UR: CLEAR — SIGNIFICANT CHANGE UP
AST SERPL-CCNC: 15 U/L — SIGNIFICANT CHANGE UP (ref 10–40)
AST SERPL-CCNC: 15 U/L — SIGNIFICANT CHANGE UP (ref 10–40)
BACTERIA # UR AUTO: NEGATIVE /HPF — SIGNIFICANT CHANGE UP
BACTERIA # UR AUTO: NEGATIVE /HPF — SIGNIFICANT CHANGE UP
BILIRUB SERPL-MCNC: 1.2 MG/DL — SIGNIFICANT CHANGE UP (ref 0.2–1.2)
BILIRUB SERPL-MCNC: 1.2 MG/DL — SIGNIFICANT CHANGE UP (ref 0.2–1.2)
BILIRUB UR-MCNC: NEGATIVE — SIGNIFICANT CHANGE UP
BILIRUB UR-MCNC: NEGATIVE — SIGNIFICANT CHANGE UP
BUN SERPL-MCNC: 15 MG/DL — SIGNIFICANT CHANGE UP (ref 7–23)
BUN SERPL-MCNC: 15 MG/DL — SIGNIFICANT CHANGE UP (ref 7–23)
CALCIUM SERPL-MCNC: 9.2 MG/DL — SIGNIFICANT CHANGE UP (ref 8.4–10.5)
CALCIUM SERPL-MCNC: 9.2 MG/DL — SIGNIFICANT CHANGE UP (ref 8.4–10.5)
CAST: 0 /LPF — SIGNIFICANT CHANGE UP (ref 0–4)
CAST: 0 /LPF — SIGNIFICANT CHANGE UP (ref 0–4)
CHLORIDE SERPL-SCNC: 103 MMOL/L — SIGNIFICANT CHANGE UP (ref 96–108)
CHLORIDE SERPL-SCNC: 103 MMOL/L — SIGNIFICANT CHANGE UP (ref 96–108)
CO2 SERPL-SCNC: 22 MMOL/L — SIGNIFICANT CHANGE UP (ref 22–31)
CO2 SERPL-SCNC: 22 MMOL/L — SIGNIFICANT CHANGE UP (ref 22–31)
COLOR SPEC: YELLOW — SIGNIFICANT CHANGE UP
COLOR SPEC: YELLOW — SIGNIFICANT CHANGE UP
CREAT SERPL-MCNC: 1.04 MG/DL — SIGNIFICANT CHANGE UP (ref 0.5–1.3)
CREAT SERPL-MCNC: 1.04 MG/DL — SIGNIFICANT CHANGE UP (ref 0.5–1.3)
DIFF PNL FLD: ABNORMAL
DIFF PNL FLD: ABNORMAL
EGFR: 77 ML/MIN/1.73M2 — SIGNIFICANT CHANGE UP
EGFR: 77 ML/MIN/1.73M2 — SIGNIFICANT CHANGE UP
GLUCOSE SERPL-MCNC: 115 MG/DL — HIGH (ref 70–99)
GLUCOSE SERPL-MCNC: 115 MG/DL — HIGH (ref 70–99)
GLUCOSE UR QL: NEGATIVE MG/DL — SIGNIFICANT CHANGE UP
GLUCOSE UR QL: NEGATIVE MG/DL — SIGNIFICANT CHANGE UP
HCT VFR BLD CALC: 42.2 % — SIGNIFICANT CHANGE UP (ref 39–50)
HCT VFR BLD CALC: 42.2 % — SIGNIFICANT CHANGE UP (ref 39–50)
HGB BLD-MCNC: 14.1 G/DL — SIGNIFICANT CHANGE UP (ref 13–17)
HGB BLD-MCNC: 14.1 G/DL — SIGNIFICANT CHANGE UP (ref 13–17)
KETONES UR-MCNC: NEGATIVE MG/DL — SIGNIFICANT CHANGE UP
KETONES UR-MCNC: NEGATIVE MG/DL — SIGNIFICANT CHANGE UP
LEUKOCYTE ESTERASE UR-ACNC: NEGATIVE — SIGNIFICANT CHANGE UP
LEUKOCYTE ESTERASE UR-ACNC: NEGATIVE — SIGNIFICANT CHANGE UP
MCHC RBC-ENTMCNC: 31.4 PG — SIGNIFICANT CHANGE UP (ref 27–34)
MCHC RBC-ENTMCNC: 31.4 PG — SIGNIFICANT CHANGE UP (ref 27–34)
MCHC RBC-ENTMCNC: 33.4 GM/DL — SIGNIFICANT CHANGE UP (ref 32–36)
MCHC RBC-ENTMCNC: 33.4 GM/DL — SIGNIFICANT CHANGE UP (ref 32–36)
MCV RBC AUTO: 94 FL — SIGNIFICANT CHANGE UP (ref 80–100)
MCV RBC AUTO: 94 FL — SIGNIFICANT CHANGE UP (ref 80–100)
NITRITE UR-MCNC: NEGATIVE — SIGNIFICANT CHANGE UP
NITRITE UR-MCNC: NEGATIVE — SIGNIFICANT CHANGE UP
NRBC # BLD: 0 /100 WBCS — SIGNIFICANT CHANGE UP (ref 0–0)
NRBC # BLD: 0 /100 WBCS — SIGNIFICANT CHANGE UP (ref 0–0)
PH UR: 5 — SIGNIFICANT CHANGE UP (ref 5–8)
PH UR: 5 — SIGNIFICANT CHANGE UP (ref 5–8)
PLATELET # BLD AUTO: 191 K/UL — SIGNIFICANT CHANGE UP (ref 150–400)
PLATELET # BLD AUTO: 191 K/UL — SIGNIFICANT CHANGE UP (ref 150–400)
POTASSIUM SERPL-MCNC: 3.8 MMOL/L — SIGNIFICANT CHANGE UP (ref 3.5–5.3)
POTASSIUM SERPL-MCNC: 3.8 MMOL/L — SIGNIFICANT CHANGE UP (ref 3.5–5.3)
POTASSIUM SERPL-SCNC: 3.8 MMOL/L — SIGNIFICANT CHANGE UP (ref 3.5–5.3)
POTASSIUM SERPL-SCNC: 3.8 MMOL/L — SIGNIFICANT CHANGE UP (ref 3.5–5.3)
PROT SERPL-MCNC: 7.4 G/DL — SIGNIFICANT CHANGE UP (ref 6–8.3)
PROT SERPL-MCNC: 7.4 G/DL — SIGNIFICANT CHANGE UP (ref 6–8.3)
PROT UR-MCNC: NEGATIVE MG/DL — SIGNIFICANT CHANGE UP
PROT UR-MCNC: NEGATIVE MG/DL — SIGNIFICANT CHANGE UP
RBC # BLD: 4.49 M/UL — SIGNIFICANT CHANGE UP (ref 4.2–5.8)
RBC # BLD: 4.49 M/UL — SIGNIFICANT CHANGE UP (ref 4.2–5.8)
RBC # FLD: 13.2 % — SIGNIFICANT CHANGE UP (ref 10.3–14.5)
RBC # FLD: 13.2 % — SIGNIFICANT CHANGE UP (ref 10.3–14.5)
RBC CASTS # UR COMP ASSIST: 1 /HPF — SIGNIFICANT CHANGE UP (ref 0–4)
RBC CASTS # UR COMP ASSIST: 1 /HPF — SIGNIFICANT CHANGE UP (ref 0–4)
REVIEW: SIGNIFICANT CHANGE UP
REVIEW: SIGNIFICANT CHANGE UP
SODIUM SERPL-SCNC: 138 MMOL/L — SIGNIFICANT CHANGE UP (ref 135–145)
SODIUM SERPL-SCNC: 138 MMOL/L — SIGNIFICANT CHANGE UP (ref 135–145)
SP GR SPEC: 1.02 — SIGNIFICANT CHANGE UP (ref 1–1.03)
SP GR SPEC: 1.02 — SIGNIFICANT CHANGE UP (ref 1–1.03)
SQUAMOUS # UR AUTO: 0 /HPF — SIGNIFICANT CHANGE UP (ref 0–5)
SQUAMOUS # UR AUTO: 0 /HPF — SIGNIFICANT CHANGE UP (ref 0–5)
UROBILINOGEN FLD QL: 1 MG/DL — SIGNIFICANT CHANGE UP (ref 0.2–1)
UROBILINOGEN FLD QL: 1 MG/DL — SIGNIFICANT CHANGE UP (ref 0.2–1)
WBC # BLD: 9.12 K/UL — SIGNIFICANT CHANGE UP (ref 3.8–10.5)
WBC # BLD: 9.12 K/UL — SIGNIFICANT CHANGE UP (ref 3.8–10.5)
WBC # FLD AUTO: 9.12 K/UL — SIGNIFICANT CHANGE UP (ref 3.8–10.5)
WBC # FLD AUTO: 9.12 K/UL — SIGNIFICANT CHANGE UP (ref 3.8–10.5)
WBC UR QL: 2 /HPF — SIGNIFICANT CHANGE UP (ref 0–5)
WBC UR QL: 2 /HPF — SIGNIFICANT CHANGE UP (ref 0–5)

## 2023-11-22 PROCEDURE — 99223 1ST HOSP IP/OBS HIGH 75: CPT

## 2023-11-22 PROCEDURE — 74177 CT ABD & PELVIS W/CONTRAST: CPT | Mod: 26,MA

## 2023-11-22 RX ORDER — ONDANSETRON 8 MG/1
8 TABLET, FILM COATED ORAL ONCE
Refills: 0 | Status: DISCONTINUED | OUTPATIENT
Start: 2023-11-22 | End: 2023-11-22

## 2023-11-22 RX ORDER — SODIUM CHLORIDE 9 MG/ML
1000 INJECTION, SOLUTION INTRAVENOUS ONCE
Refills: 0 | Status: COMPLETED | OUTPATIENT
Start: 2023-11-22 | End: 2023-11-22

## 2023-11-22 RX ORDER — ACETAMINOPHEN 500 MG
1000 TABLET ORAL ONCE
Refills: 0 | Status: COMPLETED | OUTPATIENT
Start: 2023-11-22 | End: 2023-11-22

## 2023-11-22 RX ORDER — ONDANSETRON 8 MG/1
4 TABLET, FILM COATED ORAL EVERY 4 HOURS
Refills: 0 | Status: DISCONTINUED | OUTPATIENT
Start: 2023-11-22 | End: 2023-11-22

## 2023-11-22 RX ORDER — SODIUM CHLORIDE 9 MG/ML
1000 INJECTION INTRAMUSCULAR; INTRAVENOUS; SUBCUTANEOUS
Refills: 0 | Status: DISCONTINUED | OUTPATIENT
Start: 2023-11-22 | End: 2023-11-26

## 2023-11-22 RX ORDER — KETOROLAC TROMETHAMINE 30 MG/ML
15 SYRINGE (ML) INJECTION ONCE
Refills: 0 | Status: DISCONTINUED | OUTPATIENT
Start: 2023-11-22 | End: 2023-11-22

## 2023-11-22 RX ORDER — TAMSULOSIN HYDROCHLORIDE 0.4 MG/1
0.4 CAPSULE ORAL ONCE
Refills: 0 | Status: COMPLETED | OUTPATIENT
Start: 2023-11-22 | End: 2023-11-22

## 2023-11-22 RX ADMIN — SODIUM CHLORIDE 125 MILLILITER(S): 9 INJECTION INTRAMUSCULAR; INTRAVENOUS; SUBCUTANEOUS at 23:07

## 2023-11-22 RX ADMIN — TAMSULOSIN HYDROCHLORIDE 0.4 MILLIGRAM(S): 0.4 CAPSULE ORAL at 19:17

## 2023-11-22 RX ADMIN — Medication 400 MILLIGRAM(S): at 16:12

## 2023-11-22 RX ADMIN — SODIUM CHLORIDE 1000 MILLILITER(S): 9 INJECTION, SOLUTION INTRAVENOUS at 16:13

## 2023-11-22 NOTE — ED PROVIDER NOTE - NS ED ROS FT
REVIEW OF SYSTEMS:    CONSTITUTIONAL: No weakness, fevers or chills  EYES/ENT: No visual changes;  No vertigo or throat pain   NECK: No pain or stiffness  RESPIRATORY: No cough, wheezing, hemoptysis; No shortness of breath  CARDIOVASCULAR: No chest pain or palpitations  GASTROINTESTINAL: + R flank pain No abdominal or epigastric pain. No nausea, vomiting, or hematemesis; + constipation. No melena or hematochezia.  GENITOURINARY: No dysuria, frequency, + hematuria  NEUROLOGICAL: No numbness or weakness  SKIN: No itching, rashes

## 2023-11-22 NOTE — ED PROVIDER NOTE - ATTENDING CONTRIBUTION TO CARE
I was the supervising attending. I have independently seen face-to-face and examined the patient. I have reviewed the history and physical and discussed the MDM with the resident, fellow, STEPHANIE and/or student. I agree with the assessment and plan as presented unless otherwise documented as follows:     68-year-old male, history of gout, prior kidney stones, presenting with right-sided flank and abdominal pain and dark urine.  Pain present over 2 days intermittently, at times present to the right flank versus the right lower quadrant.  Had 1 episode of opaque dark brown-colored urine with some mild burning.  Urine has cleared since then.  Has tried Tylenol with minimal relief.  Last BM yesterday.  Previously passed kidney stone spontaneously, states symptoms are not as severe as prior kidney stone pain.  Otherwise denies fevers or chills, nausea or vomiting.  Patient appears well, no acute distress.  No CVA tenderness, no significant abdominal tenderness.  DDx recurrent stone versus hemorrhagic cystitis versus pyelonephritis versus appendicitis/colitis.  Will obtain labs, UA, CT.  Will reassess after pain medication and fluids. -Poonam Ramirez MD (Attending)

## 2023-11-22 NOTE — ED ADULT NURSE NOTE - VOIDING
"Continues weekly [place].  Having increased dizziness ( is anemic and not taking iron recommended/prescribed), but has not had a syncopal episode. Agreed to take iron as directed! Take PNV daily as directed.   Having more bouts of pressure and particularly "bad night with cramping". States didn't go to hospital because it eventually stopped and I did not have any spotting.  Remains on pelvic rest.  No CI to FFN and specimen obtained. If + will consult Dr. Ferris re: BMZ series.  VE done:  EOS 1 cm/ IOS closed/ 70% effaced/ -3 ballotable.  No discharge on pink on glove.  Placed on rest for next 2 weeks, increase fluids, take iron and monitor for s/s PTL. Letter for employer provided  Will present to hospital for any episode of cramping with ctx q 10 mins for 1 hour for evaluation.   [place] to cont. Wkly.  OV 2 wks for evaluation or sooner if indicated.    "
difficulty starting stream

## 2023-11-22 NOTE — ED PROVIDER NOTE - OBJECTIVE STATEMENT
68M PMH of kidney stones in 2020, gout, p/w 2 days of flank pain and hematuria. He says he was in his usual state of health 2 days ago when he noted R sided flank pain. He describes the pain as dull and pressure-like. He says it's currently 3/10 in intensity and doesn't radiate. The next night he noted gross hematuria. He also reports constipation since Monday. He denies fevers/chills, pain during urination, cp, cough, or rash. He reports R knee pain for which he is scheduled to see an orthopedic surgeon outpt.

## 2023-11-22 NOTE — ED ADULT NURSE NOTE - OBJECTIVE STATEMENT
68 y.o M BIB self p/w c/o hematuria. A+Ox4. Pt states x2 days started having sudden onset R flank pain that subsided yesterday. Reports when pain subsided started noticing pink-tinged urine associated /w trouble starting stream. Endorsing pain came back last night at 0300 AM and was unable to fall back asleep. Took x2 tylenol at 0500 AM w/ partial relief. PMH kidney stones (aug 2020) and gout. Also reporting constipation, LBM yesterday morning. Denies any f/c, n/v/d, CP, SOB. R flank pain rated 4/10 on pain scale radiating forward towards umbilicus. Also c/o R knee pain, reports went to PCP and found to have elevated ESR. No other complaints at this time, safety maintained.

## 2023-11-22 NOTE — ED PROVIDER NOTE - PROGRESS NOTE DETAILS
Labs with normal kidney function, no UTI. CT with 3mm L ureteral stone. Patient reassessed, continuing to complain of pain/nausea. Will place in CDU for hydration, continued medications. -Poonam Ramirez MD (Attending)

## 2023-11-22 NOTE — ED PROVIDER NOTE - CLINICAL SUMMARY MEDICAL DECISION MAKING FREE TEXT BOX
68M pmh kidney stones, gout, came in w/ 2 days of R flank pain, hematuria.  Will order labs, CT AP, UA, reassess

## 2023-11-22 NOTE — ED ADULT NURSE REASSESSMENT NOTE - NSFALLUNIVINTERV_ED_ALL_ED
Bed/Stretcher in lowest position, wheels locked, appropriate side rails in place/Call bell, personal items and telephone in reach/Instruct patient to call for assistance before getting out of bed/chair/stretcher/Non-slip footwear applied when patient is off stretcher/[place] to call system/Physically safe environment - no spills, clutter or unnecessary equipment/Purposeful proactive rounding/Room/bathroom lighting operational, light cord in reach

## 2023-11-22 NOTE — ED ADULT TRIAGE NOTE - BMI (KG/M2)
Regarding: Test result questions   ----- Message from Mickey Smart sent at 6/30/2017  5:37 PM CDT -----  Patient Name: Stu So  Specialist or PCP:Dr. Adams  Pregnant (If Yes, how long?):No  Symptoms:Test result questions   Call Back #:[phone]  Is the patient’s permanent residence located in [place], [place], or a [facility]? Yes [facility] [id number]  Call Center Account #:[id number]       [id number]

## 2023-11-22 NOTE — ED PROVIDER NOTE - PHYSICAL EXAMINATION
PHYSICAL EXAM:  GENERAL: NAD, Resting in bed  HEENT:  Head atraumatic, EOMI, PERRLA, conjunctiva and sclera clear; Moist mucous membranes, normal oropharynx  NECK: Supple, No JVD, no lymphadenopathy, no thyroid nodules or enlargement  CHEST/LUNG: Clear to auscultation bilaterally; No rales, rhonchi, wheezing, or rubs. Unlabored respirations on room air  HEART: Regular rate and rhythm; No murmurs, rubs, or gallops  ABDOMEN: R flank tenderness to palpation. Bowel sounds present; Soft, Nontender, Nondistended. No hepatomegally  EXTREMITIES:  2+ Peripheral Pulses, brisk capillary refill. No clubbing, cyanosis, or edema  NERVOUS SYSTEM:  Alert & Oriented X3, non-focal and spontaneous movements of all extremities  SKIN: No rashes or lesions

## 2023-11-23 VITALS
DIASTOLIC BLOOD PRESSURE: 73 MMHG | HEART RATE: 70 BPM | SYSTOLIC BLOOD PRESSURE: 125 MMHG | TEMPERATURE: 98 F | OXYGEN SATURATION: 96 % | RESPIRATION RATE: 18 BRPM

## 2023-11-23 LAB
ALBUMIN SERPL ELPH-MCNC: 3.8 G/DL — SIGNIFICANT CHANGE UP (ref 3.3–5)
ALBUMIN SERPL ELPH-MCNC: 3.8 G/DL — SIGNIFICANT CHANGE UP (ref 3.3–5)
ALP SERPL-CCNC: 65 U/L — SIGNIFICANT CHANGE UP (ref 40–120)
ALP SERPL-CCNC: 65 U/L — SIGNIFICANT CHANGE UP (ref 40–120)
ALT FLD-CCNC: 11 U/L — SIGNIFICANT CHANGE UP (ref 10–45)
ALT FLD-CCNC: 11 U/L — SIGNIFICANT CHANGE UP (ref 10–45)
ANION GAP SERPL CALC-SCNC: 10 MMOL/L — SIGNIFICANT CHANGE UP (ref 5–17)
ANION GAP SERPL CALC-SCNC: 10 MMOL/L — SIGNIFICANT CHANGE UP (ref 5–17)
AST SERPL-CCNC: 13 U/L — SIGNIFICANT CHANGE UP (ref 10–40)
AST SERPL-CCNC: 13 U/L — SIGNIFICANT CHANGE UP (ref 10–40)
BILIRUB SERPL-MCNC: 1.4 MG/DL — HIGH (ref 0.2–1.2)
BILIRUB SERPL-MCNC: 1.4 MG/DL — HIGH (ref 0.2–1.2)
BUN SERPL-MCNC: 12 MG/DL — SIGNIFICANT CHANGE UP (ref 7–23)
BUN SERPL-MCNC: 12 MG/DL — SIGNIFICANT CHANGE UP (ref 7–23)
CALCIUM SERPL-MCNC: 8.6 MG/DL — SIGNIFICANT CHANGE UP (ref 8.4–10.5)
CALCIUM SERPL-MCNC: 8.6 MG/DL — SIGNIFICANT CHANGE UP (ref 8.4–10.5)
CHLORIDE SERPL-SCNC: 106 MMOL/L — SIGNIFICANT CHANGE UP (ref 96–108)
CHLORIDE SERPL-SCNC: 106 MMOL/L — SIGNIFICANT CHANGE UP (ref 96–108)
CO2 SERPL-SCNC: 24 MMOL/L — SIGNIFICANT CHANGE UP (ref 22–31)
CO2 SERPL-SCNC: 24 MMOL/L — SIGNIFICANT CHANGE UP (ref 22–31)
CREAT SERPL-MCNC: 0.95 MG/DL — SIGNIFICANT CHANGE UP (ref 0.5–1.3)
CREAT SERPL-MCNC: 0.95 MG/DL — SIGNIFICANT CHANGE UP (ref 0.5–1.3)
CULTURE RESULTS: SIGNIFICANT CHANGE UP
CULTURE RESULTS: SIGNIFICANT CHANGE UP
EGFR: 86 ML/MIN/1.73M2 — SIGNIFICANT CHANGE UP
EGFR: 86 ML/MIN/1.73M2 — SIGNIFICANT CHANGE UP
GLUCOSE SERPL-MCNC: 105 MG/DL — HIGH (ref 70–99)
GLUCOSE SERPL-MCNC: 105 MG/DL — HIGH (ref 70–99)
POTASSIUM SERPL-MCNC: 3.5 MMOL/L — SIGNIFICANT CHANGE UP (ref 3.5–5.3)
POTASSIUM SERPL-MCNC: 3.5 MMOL/L — SIGNIFICANT CHANGE UP (ref 3.5–5.3)
POTASSIUM SERPL-SCNC: 3.5 MMOL/L — SIGNIFICANT CHANGE UP (ref 3.5–5.3)
POTASSIUM SERPL-SCNC: 3.5 MMOL/L — SIGNIFICANT CHANGE UP (ref 3.5–5.3)
PROT SERPL-MCNC: 6.3 G/DL — SIGNIFICANT CHANGE UP (ref 6–8.3)
PROT SERPL-MCNC: 6.3 G/DL — SIGNIFICANT CHANGE UP (ref 6–8.3)
SODIUM SERPL-SCNC: 140 MMOL/L — SIGNIFICANT CHANGE UP (ref 135–145)
SODIUM SERPL-SCNC: 140 MMOL/L — SIGNIFICANT CHANGE UP (ref 135–145)
SPECIMEN SOURCE: SIGNIFICANT CHANGE UP
SPECIMEN SOURCE: SIGNIFICANT CHANGE UP

## 2023-11-23 PROCEDURE — 81001 URINALYSIS AUTO W/SCOPE: CPT

## 2023-11-23 PROCEDURE — 87086 URINE CULTURE/COLONY COUNT: CPT

## 2023-11-23 PROCEDURE — 96374 THER/PROPH/DIAG INJ IV PUSH: CPT | Mod: XU

## 2023-11-23 PROCEDURE — 36415 COLL VENOUS BLD VENIPUNCTURE: CPT

## 2023-11-23 PROCEDURE — 99285 EMERGENCY DEPT VISIT HI MDM: CPT | Mod: 25

## 2023-11-23 PROCEDURE — G0378: CPT

## 2023-11-23 PROCEDURE — 80053 COMPREHEN METABOLIC PANEL: CPT

## 2023-11-23 PROCEDURE — 74177 CT ABD & PELVIS W/CONTRAST: CPT | Mod: MA

## 2023-11-23 PROCEDURE — 85027 COMPLETE CBC AUTOMATED: CPT

## 2023-11-23 PROCEDURE — 93005 ELECTROCARDIOGRAM TRACING: CPT

## 2023-11-23 PROCEDURE — 99239 HOSP IP/OBS DSCHRG MGMT >30: CPT

## 2023-11-23 RX ORDER — TAMSULOSIN HYDROCHLORIDE 0.4 MG/1
1 CAPSULE ORAL
Qty: 7 | Refills: 0
Start: 2023-11-23 | End: 2023-11-29

## 2023-11-23 RX ORDER — ONDANSETRON 8 MG/1
1 TABLET, FILM COATED ORAL
Qty: 6 | Refills: 0
Start: 2023-11-23

## 2023-11-23 NOTE — ED CDU PROVIDER SUBSEQUENT DAY NOTE - PHYSICAL EXAMINATION
· CONSTITUTIONAL: Well appearing, awake, alert, oriented to person, place, time/situation and in no apparent distress.  · ENMT: Airway patent.  · EYES: Clear bilaterally  · CARDIAC: Normal rate, regular rhythm.  Heart sounds S1, S2.  No murmurs  · RESPIRATORY: CTAB  · GASTROINTESTINAL: Abdomen soft, non-tender, no CVAT  · MUSCULOSKELETAL: Steady gait, moving all extremities, no lower extremity edema, able to extend/flex b/l knees  · SKIN: No evidence of rash.  · PSYCHIATRIC: normal mood and affect.

## 2023-11-23 NOTE — ED CDU PROVIDER DISPOSITION NOTE - CARE PROVIDER_API CALL
Hoenig, David Mayer  Urology  [address] [place] [id number]  Phone: [phone]  Fax: [phone]  Follow Up Time: 1-3 Days

## 2023-11-23 NOTE — ED CDU PROVIDER INITIAL DAY NOTE - DETAILS
pain control, repeat labs, IVF, Flomax, vitals every 4 hours, frequent reevaluations   THALIA Ramirez

## 2023-11-23 NOTE — ED CDU PROVIDER DISPOSITION NOTE - NSFOLLOWUPINSTRUCTIONS_ED_ALL_ED_FT
Hydrate.     Take Flomax 0.4mg once a night.     Please take Tylenol 650mg and Motrin 600mg every 6 hours as needed for pain. For breakthrough/severe pain you can take Oxycodone 5mg every 6 hours. THIS MEDICATION CAN MAKE YOU DROWSY, PLEASE DO NOT DRIVE ON THIS MEDICATION.    Please use the strainers when you urinate. If you catch a stone please bring it to your follow up appointment.    Please follow up with your urologist, call the office tomorrow to make a follow up appointment.     Please return to the Emergency Department with new, worsening, or concerning symptoms, such as:  -FEVERS  -Severe/worsening pain  -Inability to urinate  -Shortness of breath or trouble breathing  -Pressure, pain, tightness in chest  -Facial drooping, arm weakness, or speech difficulty   -Head injury or loss of consciousness   -Nonstop bleeding or an open wound     *More detailed information regarding your visit and discharge can be found by reviewing this packet Rest and stay well hydrated.     Take Flomax 0.4mg once daily at bedtime.    Take Ibuprofen (i.e. Motrin, Advil) 600mg every 8 hrs for pain as needed. Take with food.   May alternate with Acetaminophen (Tylenol) 650mg every 6 hours for pain as needed.     May take zofran 4mg tab once every 8 hours as needed for nausea.     Please use the strainers when you urinate. If you catch a stone please bring it to your follow up appointment.    Please follow up with your urologist Dr. Hoenig, call the office tomorrow to make a follow up appointment.     Please return to the Emergency Department with new, worsening, or concerning symptoms, such as fever/chills, worsening pain, vomiting, unable to eat/drink, trouble urinating, passing out, or any other concerns. Rest and stay well hydrated.     Take Flomax 0.4mg once daily at bedtime.    Take Ibuprofen (i.e. Motrin, Advil) 600mg every 8 hrs for pain as needed. Take with food.   May alternate with Acetaminophen (Tylenol) 650mg every 6 hours for pain as needed.     May take zofran 4mg tab once every 8 hours as needed for nausea.     Please use the strainers when you urinate. If you catch a stone please bring it to your follow up appointment.    Please follow up with your urologist Dr. Hoenig, call the office tomorrow to make a follow up appointment.     Please follow up with your primary care provider upon discharge. Be sure to review all CT scan findings with your provider including:  Scattered small well marginated sclerotic foci of the bones are similar   to prior imaging from 2020, largest of L1 vertebral body.   *Bone scan can be obtained for characterization.    Please return to the Emergency Department with new, worsening, or concerning symptoms, such as fever/chills, worsening pain, vomiting, unable to eat/drink, trouble urinating, passing out, or any other concerns.

## 2023-11-23 NOTE — ED CDU PROVIDER INITIAL DAY NOTE - NSICDXFAMILYHX_GEN_ALL_CORE_FT
FAMILY HISTORY:  Grandparent  Still living? Unknown  Family history of kidney stone, Age at diagnosis: Age Unknown

## 2023-11-23 NOTE — ED ADULT NURSE REASSESSMENT NOTE - NS ED NURSE REASSESS COMMENT FT1
0700 Report received from JOHAN Carrington Pt AAOx4, NAD, resp nonlabored, skin warm/dry, resting comfortably in bed . Pt denies headache, dizziness, chest pain, palpitations, SOB, abd pain, n/v/d, urinary symptoms, fevers, chills, weakness at this time. Pt awaiting for results . Safety maintained with call bell within reach.
Pt verbalizes understanding to f/u with PCP/urologist and return to ED for any worsening symptoms. Patient d/c home w/ written and verbal instructions. Pt verbalized understanding. IV d/c - No redness or swelling.
Pt received from HANSEL Velazquez. Pt A&O X4, oriented to CDU & plan of care was discussed. Pt in CDU for IV fluids, and monitoring. Pt denies any pain, SOB, dizziness or palpitations. V/S stable, IV fluid infusing as ordered, IV patent and free of signs of infiltration. Pt resting in bed. Safety & comfort measures maintained. Call bell in reach. Will continue to monitor.

## 2023-11-23 NOTE — ED CDU PROVIDER INITIAL DAY NOTE - OBJECTIVE STATEMENT
68M PMH of Kidney stones (no surgical intervention), Gout, p/w 2 days of flank pain and hematuria. He says he was in his usual state of health 2 days ago when he noted R sided flank pain. Pain radiates to right side of his abdomen. Also reports changes to the color of his urine with increased urinary frequency. Reports constipation, with last bowel movement on Monday. Denies fever, dysuria, chest pain, vomiting, cough. He also reports R knee pain for which he is in the process of scheduling an outpatient orthopedic appointment. Has been able to ambulate.  Urology: Hoenig  ED course: Afebrile. CT showed "3 mm distal right ureteral obstructing stone" UA +small blood and creatine 1.04. Given IVF and Ofirmev with improving pain. Plan for continued pain control and IVF in CDU.

## 2023-11-23 NOTE — ED CDU PROVIDER SUBSEQUENT DAY NOTE - CLINICAL SUMMARY MEDICAL DECISION MAKING FREE TEXT BOX
Fannie: Patient with known kidney stone with concerns for pain. Patient with no infection. CT with no other pathology. Will hydrate and continue treatment. If pain control stays in reason will d/c with f/u

## 2023-11-23 NOTE — ED CDU PROVIDER SUBSEQUENT DAY NOTE - HISTORY
No interval changes since initial CDU provider note. Pt without new complaint. NAD VSS. Plan to continue pain control and hydration in the setting of renal colic. - BRYAN Maria

## 2023-11-23 NOTE — ED CDU PROVIDER INITIAL DAY NOTE - CLINICAL SUMMARY MEDICAL DECISION MAKING FREE TEXT BOX
I was the supervising attending. I have independently seen face-to-face and examined the patient. I have reviewed the history and physical and discussed the MDM with the resident, fellow, STEPHANIE and/or student. I agree with the assessment and plan as presented. Additional information can be found in the ED Provider Note.

## 2023-11-23 NOTE — ED CDU PROVIDER DISPOSITION NOTE - CLINICAL COURSE
68M PMH of Kidney stones (no surgical intervention), Gout, p/w 2 days of flank pain and hematuria. He says he was in his usual state of health 2 days ago when he noted R sided flank pain. Pain radiates to right side of his abdomen. Also reports changes to the color of his urine with increased urinary frequency. Reports constipation, with last bowel movement on Monday. Denies fever, dysuria, chest pain, vomiting, cough. He also reports R knee pain for which he is in the process of scheduling an outpatient orthopedic appointment. Has been able to ambulate.  Urology: Hoenig  ED course: Afebrile. CT showed "3 mm distal right ureteral obstructing stone" UA +small blood and creatine 1.04. Given IVF and Ofirmev with improving pain. Plan for continued pain control and IVF in CDU. 68M PMH of Kidney stones (no surgical intervention), Gout, p/w 2 days of flank pain and hematuria. He says he was in his usual state of health 2 days ago when he noted R sided flank pain. Pain radiates to right side of his abdomen. Also reports changes to the color of his urine with increased urinary frequency. Reports constipation, with last bowel movement on Monday. Denies fever, dysuria, chest pain, vomiting, cough. He also reports R knee pain for which he is in the process of scheduling an outpatient orthopedic appointment. Has been able to ambulate.  Urology: Hoenig  ED course: Afebrile. CT showed "3 mm distal right ureteral obstructing stone" UA +small blood and creatine 1.04. Given IVF and Ofirmev with improving pain. Plan for continued pain control and IVF in CDU.  In CDU, patient's pain controlled overnight. Patient re-evaluated in AM, resting comfortably, NAD. Reports pain has been controlled since yesterday, no fever/chills, no n/v. Tolerating PO overnight. Pt reports that he does not take narcotics for pain. Reports that his urologist sent flomax to his pharmacy however it is closed today (Thanksgiving), will send rx to nearby [facility] (called pharmacy to confirm open today). Pt seen by kilo Fernandez for d/c.

## 2023-11-23 NOTE — ED CDU PROVIDER SUBSEQUENT DAY NOTE - ATTENDING APP SHARED VISIT CONTRIBUTION OF CARE
I Diego Greco MD have personally performed a face to face diagnostic evaluation on this patient.  I have reviewed the ACP note and agree with the history, exam, and plan of care, except as noted.   My medical decison making and observations are found above.  The patient is stable for CDU.  Lungs clear, abd soft

## 2023-11-23 NOTE — ED CDU PROVIDER SUBSEQUENT DAY NOTE - NS ED ROS FT
· GASTROINTESTINAL: - - -  · Gastrointestinal [+]: +flank pain  · ROS STATEMENT: all other ROS negative except as per HPI

## 2023-11-23 NOTE — ED CDU PROVIDER SUBSEQUENT DAY NOTE - PROGRESS NOTE DETAILS
Patient seen and evaluated at bedside, resting comfortably, NAD. Reports pain has been controlled since yesterday, no fever/chills, no n/v. Tolerating PO overnight. Pt reports that he does not take narcotics for pain. Reports that his urologist sent flomax to his pharmacy however it is closed today (Thanksgiving), will send rx to nearby [facility] (called pharmacy to confirm open today). Pt seen by kilo Fernandez for d/c. Patient seen and evaluated at bedside, resting comfortably, NAD. Reports pain has been controlled since yesterday, no fever/chills, no n/v. Tolerating PO overnight. Pt reports that he does not take narcotics for pain. Reports that his urologist sent flomax to his pharmacy however it is closed today (Thanksgiving), will send rx to nearby [facility] (called pharmacy to confirm open today). VSS. Pt seen by kilo Fernandez for d/c.

## 2023-11-23 NOTE — ED CDU PROVIDER DISPOSITION NOTE - PATIENT PORTAL LINK FT
You can access the FollowMyHealth Patient Portal offered by [facility] by registering at the following website: [email or web address]. By joining [facility] [employer]’s FollowMyHealth portal, you will also be able to view your health information using other applications (apps) compatible with our system.

## 2023-11-23 NOTE — ED CDU PROVIDER DISPOSITION NOTE - ATTENDING APP SHARED VISIT CONTRIBUTION OF CARE
I Diego Greco MD have personally performed a face to face diagnostic evaluation on this patient.  I have reviewed the ACP note and agree with the history, exam, and plan of care, except as noted.   My medical decison making and observations are found above.  The patient is stable for CDU.  Pain free, abd soft, lungs clear

## 2023-11-30 PROBLEM — M10.9 GOUT, UNSPECIFIED: Chronic | Status: ACTIVE | Noted: 2023-11-22

## 2023-11-30 PROBLEM — N20.0 CALCULUS OF KIDNEY: Chronic | Status: ACTIVE | Noted: 2023-11-22

## 2023-12-04 ENCOUNTER — APPOINTMENT (OUTPATIENT)
Dept: ORTHOPEDIC SURGERY | Facility: CLINIC | Age: 68
End: 2023-12-04
Payer: MEDICARE

## 2023-12-04 VITALS
HEART RATE: 79 BPM | OXYGEN SATURATION: 97 % | HEIGHT: 68.5 IN | BODY MASS INDEX: 29.96 KG/M2 | WEIGHT: 200 LBS | DIASTOLIC BLOOD PRESSURE: 77 MMHG | SYSTOLIC BLOOD PRESSURE: 112 MMHG | TEMPERATURE: 97.4 F

## 2023-12-04 DIAGNOSIS — N20.0 CALCULUS OF KIDNEY: ICD-10-CM

## 2023-12-04 DIAGNOSIS — M25.561 PAIN IN RIGHT KNEE: ICD-10-CM

## 2023-12-04 DIAGNOSIS — M54.50 LOW BACK PAIN, UNSPECIFIED: ICD-10-CM

## 2023-12-04 DIAGNOSIS — Z78.9 OTHER SPECIFIED HEALTH STATUS: ICD-10-CM

## 2023-12-04 DIAGNOSIS — F19.90 OTHER PSYCHOACTIVE SUBSTANCE USE, UNSPECIFIED, UNCOMPLICATED: ICD-10-CM

## 2023-12-04 DIAGNOSIS — S83.91XA SPRAIN OF UNSPECIFIED SITE OF RIGHT KNEE, INITIAL ENCOUNTER: ICD-10-CM

## 2023-12-04 PROCEDURE — 72100 X-RAY EXAM L-S SPINE 2/3 VWS: CPT

## 2023-12-04 PROCEDURE — 73564 X-RAY EXAM KNEE 4 OR MORE: CPT | Mod: RT

## 2023-12-04 PROCEDURE — 99204 OFFICE O/P NEW MOD 45 MIN: CPT

## 2023-12-04 RX ORDER — ALLOPURINOL 200 MG/1
TABLET ORAL
Refills: 0 | Status: ACTIVE | COMMUNITY

## 2023-12-13 ENCOUNTER — OUTPATIENT (OUTPATIENT)
Dept: OUTPATIENT SERVICES | Facility: HOSPITAL | Age: 68
LOS: 1 days | End: 2023-12-13
Payer: MEDICARE

## 2023-12-13 ENCOUNTER — APPOINTMENT (OUTPATIENT)
Dept: UROLOGY | Facility: CLINIC | Age: 68
End: 2023-12-13
Payer: MEDICARE

## 2023-12-13 DIAGNOSIS — N40.0 BENIGN PROSTATIC HYPERPLASIA WITHOUT LOWER URINARY TRACT SYMPTOMS: ICD-10-CM

## 2023-12-13 DIAGNOSIS — N20.1 CALCULUS OF URETER: ICD-10-CM

## 2023-12-13 DIAGNOSIS — N40.0 BENIGN PROSTATIC HYPERPLASIA WITHOUT LOWER URINARY TRACT SYMPMS: ICD-10-CM

## 2023-12-13 DIAGNOSIS — N20.0 CALCULUS OF KIDNEY: ICD-10-CM

## 2023-12-13 DIAGNOSIS — R35.0 FREQUENCY OF MICTURITION: ICD-10-CM

## 2023-12-13 PROCEDURE — 99214 OFFICE O/P EST MOD 30 MIN: CPT

## 2023-12-13 PROCEDURE — 76775 US EXAM ABDO BACK WALL LIM: CPT

## 2023-12-13 NOTE — ASSESSMENT
[FreeTextEntry1] : Renal US reviewed: likely ~ 3 mm right lower pole stone, with twinkle artifact. left lower pole 1.2 cm stone, with shadowing. No hydro. renal cysts as described.  D/w pt- unclear if stone still present right distal ureter risks to kidney function if still present, despite lack of pain and absence of hydro rec CT stone hunt to assess- can f/u with ttm in approx 2 weeks

## 2023-12-13 NOTE — HISTORY OF PRESENT ILLNESS
[FreeTextEntry1] : Pt is 69 yo male f/u for eval/management of stone. Had recent right renal colic 11/2023- ER CT scan reviewed: no change to left lower pole ~ 1.1 cm stone, though density read is 1274 HU, more suggestive of dense calcium stone. Right distal ureteral stone ~ 5 mm (my measurement, radiology reports 2.6 mm). Small right lower pole stone ~ 2-3 mm.  Previous h/o left renal colic in 10/2020, and may have had similar, earlier right side renal colic prior to that but undiagnosed. No new changes- feeling well. No pain episodes. No hematuria, dysuria.  CT stone hunt 10/2020 showed ~ 1.3 cm left lower pole stone and 5 mm right renal stone, left ureteral stone.  Overall, has been relatively stable until most recent right flank pain. Has not seen stone pass, but no pain at this time.   [None] : no symptoms

## 2023-12-15 ENCOUNTER — NON-APPOINTMENT (OUTPATIENT)
Age: 68
End: 2023-12-15

## 2023-12-20 ENCOUNTER — APPOINTMENT (OUTPATIENT)
Dept: CT IMAGING | Facility: IMAGING CENTER | Age: 68
End: 2023-12-20
Payer: MEDICARE

## 2023-12-20 ENCOUNTER — OUTPATIENT (OUTPATIENT)
Dept: OUTPATIENT SERVICES | Facility: HOSPITAL | Age: 68
LOS: 1 days | End: 2023-12-20
Payer: MEDICARE

## 2023-12-20 DIAGNOSIS — N20.1 CALCULUS OF URETER: ICD-10-CM

## 2023-12-20 DIAGNOSIS — N20.0 CALCULUS OF KIDNEY: ICD-10-CM

## 2023-12-20 PROCEDURE — 74176 CT ABD & PELVIS W/O CONTRAST: CPT | Mod: 26

## 2023-12-20 PROCEDURE — 74176 CT ABD & PELVIS W/O CONTRAST: CPT

## 2023-12-26 ENCOUNTER — APPOINTMENT (OUTPATIENT)
Dept: UROLOGY | Facility: CLINIC | Age: 68
End: 2023-12-26
Payer: MEDICARE

## 2023-12-26 PROCEDURE — 99443: CPT

## 2023-12-26 NOTE — ASSESSMENT
[FreeTextEntry1] : I had long discussion with patient about their stones, and about options, risks, and benefits of all treatments.  Main options for definitive stone treatment include ESWL, URS, PCNL.    ESWL success best with smaller, less dense stones, and with short skin to stone distance and favorable location of the stone within the urinary tract, while URS is more successful treatment with multiple stones, more dense stones, or challenging body habitus or stone location.  Risks of nontreatment with obstruction can lead to very high rate of renal function loss in stone-bearing kidney over the next months to years.  In this patient's case, I recommend... cysto, right ureteroscopy with laser, stone removal, possible right stent  Risks/benefits/success/recovery expectations all reviewed at length, particularly with respect to patient's comorbidities, and inclusive of infection/sepsis, bleeding, need for secondary procedures or secondary stages such as embolization or open surgery, and even risks of death due to acute issues superimposed on comorbidities.  Pt prefers to undergo: cysto, right ureteroscopy with laser, stone removal, possible right stent  Will schedule.  Urine culture, PST appt to schedule once surgery scheduled.

## 2023-12-26 NOTE — HISTORY OF PRESENT ILLNESS
[Other Location: e.g. School (Enter Location, City,State)___] : at [unfilled], at the time of the visit. [Other Location: e.g. Home (Enter Location, City,State)___] : at [unfilled]

## 2023-12-29 ENCOUNTER — NON-APPOINTMENT (OUTPATIENT)
Age: 68
End: 2023-12-29

## 2023-12-29 RX ORDER — TAMSULOSIN HYDROCHLORIDE 0.4 MG/1
0.4 CAPSULE ORAL
Qty: 30 | Refills: 0 | Status: ACTIVE | COMMUNITY
Start: 2023-11-22 | End: 1900-01-01

## 2024-01-03 ENCOUNTER — OUTPATIENT (OUTPATIENT)
Dept: OUTPATIENT SERVICES | Facility: HOSPITAL | Age: 69
LOS: 1 days | End: 2024-01-03
Payer: MEDICARE

## 2024-01-03 VITALS
DIASTOLIC BLOOD PRESSURE: 87 MMHG | SYSTOLIC BLOOD PRESSURE: 146 MMHG | WEIGHT: 199.08 LBS | HEIGHT: 68.5 IN | HEART RATE: 44 BPM | RESPIRATION RATE: 16 BRPM | OXYGEN SATURATION: 97 % | TEMPERATURE: 98 F

## 2024-01-03 DIAGNOSIS — N20.1 CALCULUS OF URETER: ICD-10-CM

## 2024-01-03 DIAGNOSIS — Z98.890 OTHER SPECIFIED POSTPROCEDURAL STATES: Chronic | ICD-10-CM

## 2024-01-03 DIAGNOSIS — Z01.818 ENCOUNTER FOR OTHER PREPROCEDURAL EXAMINATION: ICD-10-CM

## 2024-01-03 LAB
ANION GAP SERPL CALC-SCNC: 11 MMOL/L — SIGNIFICANT CHANGE UP (ref 5–17)
ANION GAP SERPL CALC-SCNC: 11 MMOL/L — SIGNIFICANT CHANGE UP (ref 5–17)
BUN SERPL-MCNC: 17 MG/DL — SIGNIFICANT CHANGE UP (ref 7–23)
BUN SERPL-MCNC: 17 MG/DL — SIGNIFICANT CHANGE UP (ref 7–23)
CALCIUM SERPL-MCNC: 9.4 MG/DL — SIGNIFICANT CHANGE UP (ref 8.4–10.5)
CALCIUM SERPL-MCNC: 9.4 MG/DL — SIGNIFICANT CHANGE UP (ref 8.4–10.5)
CHLORIDE SERPL-SCNC: 104 MMOL/L — SIGNIFICANT CHANGE UP (ref 96–108)
CHLORIDE SERPL-SCNC: 104 MMOL/L — SIGNIFICANT CHANGE UP (ref 96–108)
CO2 SERPL-SCNC: 24 MMOL/L — SIGNIFICANT CHANGE UP (ref 22–31)
CO2 SERPL-SCNC: 24 MMOL/L — SIGNIFICANT CHANGE UP (ref 22–31)
CREAT SERPL-MCNC: 0.92 MG/DL — SIGNIFICANT CHANGE UP (ref 0.5–1.3)
CREAT SERPL-MCNC: 0.92 MG/DL — SIGNIFICANT CHANGE UP (ref 0.5–1.3)
EGFR: 89 ML/MIN/1.73M2 — SIGNIFICANT CHANGE UP
EGFR: 89 ML/MIN/1.73M2 — SIGNIFICANT CHANGE UP
GLUCOSE SERPL-MCNC: 98 MG/DL — SIGNIFICANT CHANGE UP (ref 70–99)
GLUCOSE SERPL-MCNC: 98 MG/DL — SIGNIFICANT CHANGE UP (ref 70–99)
HCT VFR BLD CALC: 44.1 % — SIGNIFICANT CHANGE UP (ref 39–50)
HCT VFR BLD CALC: 44.1 % — SIGNIFICANT CHANGE UP (ref 39–50)
HGB BLD-MCNC: 14.1 G/DL — SIGNIFICANT CHANGE UP (ref 13–17)
HGB BLD-MCNC: 14.1 G/DL — SIGNIFICANT CHANGE UP (ref 13–17)
MCHC RBC-ENTMCNC: 30.7 PG — SIGNIFICANT CHANGE UP (ref 27–34)
MCHC RBC-ENTMCNC: 30.7 PG — SIGNIFICANT CHANGE UP (ref 27–34)
MCHC RBC-ENTMCNC: 32 GM/DL — SIGNIFICANT CHANGE UP (ref 32–36)
MCHC RBC-ENTMCNC: 32 GM/DL — SIGNIFICANT CHANGE UP (ref 32–36)
MCV RBC AUTO: 96.1 FL — SIGNIFICANT CHANGE UP (ref 80–100)
MCV RBC AUTO: 96.1 FL — SIGNIFICANT CHANGE UP (ref 80–100)
NRBC # BLD: 0 /100 WBCS — SIGNIFICANT CHANGE UP (ref 0–0)
NRBC # BLD: 0 /100 WBCS — SIGNIFICANT CHANGE UP (ref 0–0)
PLATELET # BLD AUTO: 180 K/UL — SIGNIFICANT CHANGE UP (ref 150–400)
PLATELET # BLD AUTO: 180 K/UL — SIGNIFICANT CHANGE UP (ref 150–400)
POTASSIUM SERPL-MCNC: 3.8 MMOL/L — SIGNIFICANT CHANGE UP (ref 3.5–5.3)
POTASSIUM SERPL-MCNC: 3.8 MMOL/L — SIGNIFICANT CHANGE UP (ref 3.5–5.3)
POTASSIUM SERPL-SCNC: 3.8 MMOL/L — SIGNIFICANT CHANGE UP (ref 3.5–5.3)
POTASSIUM SERPL-SCNC: 3.8 MMOL/L — SIGNIFICANT CHANGE UP (ref 3.5–5.3)
RBC # BLD: 4.59 M/UL — SIGNIFICANT CHANGE UP (ref 4.2–5.8)
RBC # BLD: 4.59 M/UL — SIGNIFICANT CHANGE UP (ref 4.2–5.8)
RBC # FLD: 13.4 % — SIGNIFICANT CHANGE UP (ref 10.3–14.5)
RBC # FLD: 13.4 % — SIGNIFICANT CHANGE UP (ref 10.3–14.5)
SODIUM SERPL-SCNC: 139 MMOL/L — SIGNIFICANT CHANGE UP (ref 135–145)
SODIUM SERPL-SCNC: 139 MMOL/L — SIGNIFICANT CHANGE UP (ref 135–145)
WBC # BLD: 5.45 K/UL — SIGNIFICANT CHANGE UP (ref 3.8–10.5)
WBC # BLD: 5.45 K/UL — SIGNIFICANT CHANGE UP (ref 3.8–10.5)
WBC # FLD AUTO: 5.45 K/UL — SIGNIFICANT CHANGE UP (ref 3.8–10.5)
WBC # FLD AUTO: 5.45 K/UL — SIGNIFICANT CHANGE UP (ref 3.8–10.5)

## 2024-01-03 PROCEDURE — 93010 ELECTROCARDIOGRAM REPORT: CPT

## 2024-01-03 PROCEDURE — 87086 URINE CULTURE/COLONY COUNT: CPT

## 2024-01-03 PROCEDURE — 93005 ELECTROCARDIOGRAM TRACING: CPT

## 2024-01-03 PROCEDURE — 80048 BASIC METABOLIC PNL TOTAL CA: CPT

## 2024-01-03 PROCEDURE — G0463: CPT

## 2024-01-03 PROCEDURE — 85027 COMPLETE CBC AUTOMATED: CPT

## 2024-01-03 NOTE — H&P PST ADULT - HISTORY OF PRESENT ILLNESS
68 year old male with PMH mao's esophagus, petit mal seizures (in elementary school - discontinued dilantin before high school - no longer follows with neurologist) and kidney stones (passed on own in 2020) was recently at [facility] ED on 11/22/23 for c/o flank pain and hematuria x 2 days. He was found to have kidney stones. He denies any current hematuria, nausea, vomiting, flank pain, fever or chills. Pt now presents to PST for scheduled cystoscopy, right ureteroscopy, laser stone removal and right stent placement with Dr. Hoenig on 1/8/24.    **Of note, patients HR during PST visit 45 BPM with /87. Pt denies any dizziness, chest pain, palpitations or syncopal episodes. 12-lead EKG ordered and obtained at PST visit which revealed NSR with arrythmia and HR 60 bpm. Anesthesiologist made aware.** 68 year old male with PMH mao's esophagus, petit mal seizures (in elementary school - discontinued dilantin before high school - no longer follows with neurologist) and kidney stones (passed on own in 2020) was recently at [facility] ED on 11/22/23 for c/o flank pain and hematuria x 2 days. He was found to have kidney stones. He denies any current hematuria, nausea, vomiting, flank pain, fever or chills. Pt now presents to PST for scheduled cystoscopy, right ureteroscopy, laser stone removal and right stent placement with Dr. Hoenig on 1/8/24.    **Of note, patients HR during PST visit 45 BPM with /87. Pt denies any dizziness, chest pain, palpitations or syncopal episodes. 12-lead EKG ordered and obtained at PST visit which revealed NSR with arrythmia and HR 60 bpm. Anesthesiologist made aware.** 68 year old male with PMH mao's esophagus, petit mal seizures (in elementary school - discontinued dilantin before high school - no longer follows with neurologist) and kidney stones (passed on own in 2020 x 2) was recently at [facility] ED on 11/22/23 for c/o flank pain and hematuria x 2 days. He was found to have kidney stones. He denies any current hematuria, nausea, vomiting, fever or chills. He does report intermittent right flank pressure. Pt now presents to PST for scheduled cystoscopy, right ureteroscopy, laser stone removal and right stent placement with Dr. Hoenig on 1/8/24.    **Of note, patients HR during PST visit 45 BPM with /87. Pt denies any dizziness, chest pain, palpitations or syncopal episodes. Upon reviewing recent ED visit and recent PCP visit, HR averaging between 60-70 bpm. 12-lead EKG ordered and obtained at PST visit which revealed NSR with arrythmia and HR 60 bpm. Dr. Mcclelland, Anesthesiologist made aware. Pt was very anxious during his PST visit. Bradycardia possibly due to vasovagal episode. As per Dr. Mcclelland, no further evaluation warranted.** 68 year old male with PMH mao's esophagus, petit mal seizures (in elementary school - discontinued dilantin before high school - no longer follows with neurologist) and kidney stones (passed on own in 2020 x 2) was recently at [facility] ED on 11/22/23 for c/o flank pain and hematuria x 2 days. He was found to have kidney stones. He denies any current hematuria, nausea, vomiting, fever or chills. He does report intermittent right flank pressure. Pt now presents to PST for scheduled cystoscopy, right ureteroscopy, laser stone removal and right stent placement with Dr. Hoenig on 1/8/24.    **Of note, patients HR during PST visit 45 BPM with /87. Pt denies any dizziness, chest pain, palpitations or syncopal episodes. Upon reviewing recent ED visit and recent PCP visit, HR averaging between 60-70 bpm. 12-lead EKG ordered and obtained at PST visit which revealed NSR with arrythmia and HR 60 bpm. Dr. Mcclelland, Anesthesiologist made aware. Pt was very anxious during his PST visit. Bradycardia possibly due to vasovagal episode. As per Dr. Mcclelland, no further evaluation warranted.** 68 year old male with PMH mao's esophagus, petit mal seizures (in elementary school - discontinued dilantin before high school - no longer follows with neurologist) and kidney stones (passed on own in 2020 x 2) was recently at [facility] ED on 11/22/23 for c/o flank pain and hematuria x 2 days. He was found to have kidney stones. He denies any current hematuria, nausea, vomiting, fever or chills. He does report intermittent right flank pressure. Pt now presents to PST for scheduled cystoscopy, right ureteroscopy, laser stone removal and right stent placement with Dr. Hoenig on 1/8/24.    **Of note, patients HR during PST visit 39-45 BPM for about 5 minutes with /87. Pt denies any dizziness, chest pain, palpitations or syncopal episodes. Upon reviewing recent ED visit and recent PCP visit, HR averaging between 60-70 bpm. 12-lead EKG ordered and obtained at PST visit which revealed NSR with arrythmia and HR 60 bpm. Dr. Mcclelland, Anesthesiologist made aware. Pt was very anxious during his PST visit. Bradycardia possibly due to vasovagal episode. As per Dr. Mcclelland, no further evaluation warranted.** 68 year old male with PMH mao's esophagus, petit mal seizures (in elementary school - discontinued dilantin before high school - no longer follows with neurologist) and kidney stones (passed on own in 2020 x 2) was recently at [facility] ED on 11/22/23 for c/o flank pain and hematuria x 2 days. He was found to have kidney stones. He denies any current hematuria, nausea, vomiting, fever or chills. He does report intermittent right flank pressure. Pt now presents to PST for scheduled cystoscopy, right ureteroscopy, laser stone removal and right stent placement with Dr. Hoenig on 1/8/24.    **Of note, patients HR during PST visit 39-45 BPM for about 5 minutes with /87. Pt denies any dizziness, chest pain, palpitations or syncopal episodes. Upon reviewing recent ED visit and recent PCP visit, HR averaging between 60-70 bpm. 12-lead EKG ordered and obtained at PST visit which revealed NSR with arrythmia and HR 60 bpm. Dr. Mcclelland, Anesthesiologist made aware. Pt was very anxious during his PST visit. Bradycardia possibly due to vasovagal episode. As per Dr. Mcclelland, no further evaluation warranted.** 68 year old male with PMH mao's esophagus, petit mal seizures (in elementary school - discontinued dilantin before high school - no longer follows with neurologist) and kidney stones (passed on own in 2020 x 2) was recently at [facility] ED on 11/22/23 for c/o flank pain and hematuria x 2 days. He was found to have kidney stones. He denies any current hematuria, nausea, vomiting, fever or chills. He does report intermittent right flank pressure. Pt now presents to PST for scheduled cystoscopy, right ureteroscopy, laser stone removal and right stent placement with Dr. Hoenig on 1/8/24.    **Of note, patients HR during PST visit 39-45 BPM for about 5 minutes with /87. Pt denies any dizziness, chest pain, palpitations or syncopal episodes. Upon reviewing recent ED visit and recent PCP visit, HR averaging between 60-70 bpm. 12-lead EKG ordered and obtained at PST visit which revealed NSR with arrythmia and HR 60 bpm. Dr. Mcclelland, Anesthesiologist made aware. Pt was very anxious during his PST visit. Pt states that he has "occasional dizziness when standing over the past year and frequent headaches." Pt advised to obtain cardiac evaluation prior to upcoming surgery. Dr. Hoenig and Dr. Julian made aware via email. Pt provided with Dr. Albert Puente's information to seek cardiac evaluation.** 68 year old male with PMH mao's esophagus, petit mal seizures (in elementary school - discontinued dilantin before high school - no longer follows with neurologist) and kidney stones (passed on own in 2020 x 2) was recently at [facility] ED on 11/22/23 for c/o flank pain and hematuria x 2 days. He was found to have kidney stones. He denies any current hematuria, nausea, vomiting, fever or chills. He does report intermittent right flank pressure. Pt now presents to PST for scheduled cystoscopy, right ureteroscopy, laser stone removal and right stent placement with Dr. Hoenig on 1/8/24.    **Of note, patients HR during PST visit 39-45 BPM for about 5 minutes with /87. Pt denies any dizziness, chest pain, palpitations or syncopal episodes. Upon reviewing recent ED visit and recent PCP visit, HR averaging between 60-70 bpm. 12-lead EKG ordered and obtained at PST visit which revealed NSR with arrythmia and HR 60 bpm. Dr. Mcclelland, Anesthesiologist made aware. Pt was very anxious during his PST visit. Pt states that he has "occasional dizziness when standing over the past year and frequent headaches." Pt advised to obtain cardiac evaluation prior to upcoming surgery. Dr. Hoenig and Dr. Julian made aware via email. Pt provided with Dr. Albert Puente's information to seek cardiac evaluation.**

## 2024-01-03 NOTE — H&P PST ADULT - CARDIOVASCULAR
details… normal/regular rate and rhythm/S1 S2 present/no gallops/no rub/no murmur bradycardia with HR between 39-44 bpm x 5 minutes - regular rhythm - 12-lead ECG performed/normal/S1 S2 present/no gallops/no rub/no murmur

## 2024-01-03 NOTE — H&P PST ADULT - NSHP PST SURGERY DATE_DT_GEN_A_CORE
CC:  Reema Horton 08-Jan-2024

## 2024-01-03 NOTE — H&P PST ADULT - NEGATIVE GENERAL GENITOURINARY SYMPTOMS
no hematuria/no renal colic/no flank pain L/no flank pain R no hematuria/no renal colic/no flank pain L/no incontinence/no dysuria/no urinary hesitancy

## 2024-01-03 NOTE — H&P PST ADULT - NSANTHOSAYNRD_GEN_A_CORE
No. YASMINE screening performed.  STOP BANG Legend: 0-2 = LOW Risk; 3-4 = INTERMEDIATE Risk; 5-8 = HIGH Risk

## 2024-01-03 NOTE — H&P PST ADULT - ASSESSMENT
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

## 2024-01-03 NOTE — H&P PST ADULT - COMMENTS
12-lead EKG ordered and obtained - reviewed with anesthesiologist Pt denies any dizziness, chest pain, palpitations or syncopal episodes. Upon reviewing recent ED visit and recent PCP visit, HR averaging between 60-70 bpm. 12-lead EKG ordered and obtained at PST visit which revealed NSR with arrythmia and HR 60 bpm. Dr. Mcclelland, Anesthesiologist made aware. Pt was very anxious during his PST visit. Bradycardia possibly due to vasovagal episode. 12-lead ECG performed and reviewed with NSR HR 60 bpm. Pt denies any dizziness, chest pain, palpitations or syncopal episodes. Upon reviewing recent ED visit and recent PCP visit, HR averaging between 60-70 bpm. 12-lead EKG ordered and obtained at PST visit which revealed NSR with arrythmia and HR 60 bpm. Dr. Mcclelland, Anesthesiologist made aware. Pt was very anxious during his PST visit. Bradycardia possibly due to vasovagal episode. Pt denies any symptoms of dizziness, chest pain or palpitations at this time. Dr. Mcclelland made aware. 12-lead EKG performed which revealed NSR with HR 60 bpm. Pt advised to obtain cardiac evaluation prior to upcoming surgery

## 2024-01-03 NOTE — H&P PST ADULT - PRIMARY CARE PROVIDER
Dr. Shayne Villatoro (PCP/GI) [phone] - last visit 11/16/23 for CPE Dr. Shayne Villatoro (PCP/GI) [phone] - last visit 11/16/23 for CPE

## 2024-01-03 NOTE — H&P PST ADULT - PROBLEM SELECTOR PLAN 1
Pt is scheduled for cystoscopy, right ureteroscopy, laser stone removal and right stent placement with Dr. Hoenig on 1/8/24  CBC, BMP and urine culture ordered and obtained at PST Pt is scheduled for cystoscopy, right ureteroscopy, laser stone removal and right stent placement with Dr. Hoenig on 1/8/24  CBC, BMP and urine culture ordered and obtained at PST  Pt advised to obtain cardiac evaluation prior to upcoming surgery - contact information for Dr. Albert Puente provided to patient - will call back when he makes an appointment

## 2024-01-03 NOTE — H&P PST ADULT - NSICDXFAMILYHX_GEN_ALL_CORE_FT
FAMILY HISTORY:  Father  Still living? Unknown  Family history of CVA, Age at diagnosis: Age Unknown    Grandparent  Still living? Unknown  Family history of kidney stone, Age at diagnosis: Age Unknown

## 2024-01-03 NOTE — H&P PST ADULT - NSICDXPASTMEDICALHX_GEN_ALL_CORE_FT
PAST MEDICAL HISTORY:  Barretts esophagus     Calculus of ureter     Gout     History of diverticulosis     History of petit-mal seizures     Kidney stone

## 2024-01-04 LAB
CULTURE RESULTS: NO GROWTH — SIGNIFICANT CHANGE UP
CULTURE RESULTS: NO GROWTH — SIGNIFICANT CHANGE UP
SPECIMEN SOURCE: SIGNIFICANT CHANGE UP
SPECIMEN SOURCE: SIGNIFICANT CHANGE UP

## 2024-01-08 ENCOUNTER — NON-APPOINTMENT (OUTPATIENT)
Age: 69
End: 2024-01-08

## 2024-01-08 ENCOUNTER — APPOINTMENT (OUTPATIENT)
Dept: UROLOGY | Facility: HOSPITAL | Age: 69
End: 2024-01-08

## 2024-01-09 ENCOUNTER — NON-APPOINTMENT (OUTPATIENT)
Age: 69
End: 2024-01-09

## 2024-01-12 PROBLEM — Z87.19 PERSONAL HISTORY OF OTHER DISEASES OF THE DIGESTIVE SYSTEM: Chronic | Status: ACTIVE | Noted: 2024-01-03

## 2024-01-12 PROBLEM — K22.70 BARRETT'S ESOPHAGUS WITHOUT DYSPLASIA: Chronic | Status: ACTIVE | Noted: 2024-01-03

## 2024-01-12 PROBLEM — Z86.69 PERSONAL HISTORY OF OTHER DISEASES OF THE NERVOUS SYSTEM AND SENSE ORGANS: Chronic | Status: ACTIVE | Noted: 2024-01-03

## 2024-01-12 PROBLEM — N20.1 CALCULUS OF URETER: Chronic | Status: ACTIVE | Noted: 2024-01-03

## 2024-01-15 ENCOUNTER — APPOINTMENT (OUTPATIENT)
Dept: CARDIOLOGY | Facility: CLINIC | Age: 69
End: 2024-01-15
Payer: MEDICARE

## 2024-01-15 VITALS
HEART RATE: 78 BPM | BODY MASS INDEX: 29.97 KG/M2 | DIASTOLIC BLOOD PRESSURE: 74 MMHG | SYSTOLIC BLOOD PRESSURE: 120 MMHG | WEIGHT: 200 LBS | OXYGEN SATURATION: 98 %

## 2024-01-15 DIAGNOSIS — I10 ESSENTIAL (PRIMARY) HYPERTENSION: ICD-10-CM

## 2024-01-15 DIAGNOSIS — R07.9 CHEST PAIN, UNSPECIFIED: ICD-10-CM

## 2024-01-15 DIAGNOSIS — R42 DIZZINESS AND GIDDINESS: ICD-10-CM

## 2024-01-15 PROCEDURE — 99215 OFFICE O/P EST HI 40 MIN: CPT

## 2024-01-15 PROCEDURE — 99214 OFFICE O/P EST MOD 30 MIN: CPT

## 2024-01-18 ENCOUNTER — NON-APPOINTMENT (OUTPATIENT)
Age: 69
End: 2024-01-18

## 2024-01-22 ENCOUNTER — APPOINTMENT (OUTPATIENT)
Dept: UROLOGY | Facility: CLINIC | Age: 69
End: 2024-01-22
Payer: MEDICARE

## 2024-01-22 PROCEDURE — 99442: CPT

## 2024-01-24 ENCOUNTER — NON-APPOINTMENT (OUTPATIENT)
Age: 69
End: 2024-01-24

## 2024-01-29 ENCOUNTER — RESULT CHARGE (OUTPATIENT)
Age: 69
End: 2024-01-29

## 2024-01-30 PROBLEM — R07.9 CHEST PAIN: Status: ACTIVE | Noted: 2023-01-04

## 2024-01-30 PROBLEM — R42 DIZZINESS: Status: ACTIVE | Noted: 2024-01-30

## 2024-01-30 PROBLEM — I10 HYPERTENSION: Status: ACTIVE | Noted: 2024-01-30

## 2024-02-02 ENCOUNTER — TRANSCRIPTION ENCOUNTER (OUTPATIENT)
Age: 69
End: 2024-02-02

## 2024-02-02 ENCOUNTER — OUTPATIENT (OUTPATIENT)
Dept: OUTPATIENT SERVICES | Facility: HOSPITAL | Age: 69
LOS: 1 days | End: 2024-02-02
Payer: MEDICARE

## 2024-02-02 VITALS
HEART RATE: 77 BPM | DIASTOLIC BLOOD PRESSURE: 88 MMHG | OXYGEN SATURATION: 96 % | RESPIRATION RATE: 16 BRPM | SYSTOLIC BLOOD PRESSURE: 163 MMHG

## 2024-02-02 VITALS
HEART RATE: 77 BPM | TEMPERATURE: 98 F | OXYGEN SATURATION: 95 % | SYSTOLIC BLOOD PRESSURE: 165 MMHG | HEIGHT: 68 IN | RESPIRATION RATE: 16 BRPM | WEIGHT: 199.96 LBS | DIASTOLIC BLOOD PRESSURE: 95 MMHG

## 2024-02-02 DIAGNOSIS — Z98.890 OTHER SPECIFIED POSTPROCEDURAL STATES: Chronic | ICD-10-CM

## 2024-02-02 DIAGNOSIS — Z01.818 ENCOUNTER FOR OTHER PREPROCEDURAL EXAMINATION: ICD-10-CM

## 2024-02-02 LAB
ALBUMIN SERPL ELPH-MCNC: 4.3 G/DL — SIGNIFICANT CHANGE UP (ref 3.3–5)
ALP SERPL-CCNC: 76 U/L — SIGNIFICANT CHANGE UP (ref 40–120)
ALT FLD-CCNC: 17 U/L — SIGNIFICANT CHANGE UP (ref 10–45)
ANION GAP SERPL CALC-SCNC: 14 MMOL/L — SIGNIFICANT CHANGE UP (ref 5–17)
AST SERPL-CCNC: 16 U/L — SIGNIFICANT CHANGE UP (ref 10–40)
BILIRUB SERPL-MCNC: 1.2 MG/DL — SIGNIFICANT CHANGE UP (ref 0.2–1.2)
BUN SERPL-MCNC: 14 MG/DL — SIGNIFICANT CHANGE UP (ref 7–23)
CALCIUM SERPL-MCNC: 9.4 MG/DL — SIGNIFICANT CHANGE UP (ref 8.4–10.5)
CHLORIDE SERPL-SCNC: 105 MMOL/L — SIGNIFICANT CHANGE UP (ref 96–108)
CO2 SERPL-SCNC: 22 MMOL/L — SIGNIFICANT CHANGE UP (ref 22–31)
CREAT SERPL-MCNC: 0.91 MG/DL — SIGNIFICANT CHANGE UP (ref 0.5–1.3)
EGFR: 90 ML/MIN/1.73M2 — SIGNIFICANT CHANGE UP
GLUCOSE SERPL-MCNC: 108 MG/DL — HIGH (ref 70–99)
HCT VFR BLD CALC: 44.4 % — SIGNIFICANT CHANGE UP (ref 39–50)
HGB BLD-MCNC: 14.8 G/DL — SIGNIFICANT CHANGE UP (ref 13–17)
MCHC RBC-ENTMCNC: 31.1 PG — SIGNIFICANT CHANGE UP (ref 27–34)
MCHC RBC-ENTMCNC: 33.3 GM/DL — SIGNIFICANT CHANGE UP (ref 32–36)
MCV RBC AUTO: 93.3 FL — SIGNIFICANT CHANGE UP (ref 80–100)
NRBC # BLD: 0 /100 WBCS — SIGNIFICANT CHANGE UP (ref 0–0)
PLATELET # BLD AUTO: 197 K/UL — SIGNIFICANT CHANGE UP (ref 150–400)
POTASSIUM SERPL-MCNC: 3.9 MMOL/L — SIGNIFICANT CHANGE UP (ref 3.5–5.3)
POTASSIUM SERPL-SCNC: 3.9 MMOL/L — SIGNIFICANT CHANGE UP (ref 3.5–5.3)
PROT SERPL-MCNC: 7.5 G/DL — SIGNIFICANT CHANGE UP (ref 6–8.3)
RBC # BLD: 4.76 M/UL — SIGNIFICANT CHANGE UP (ref 4.2–5.8)
RBC # FLD: 12.9 % — SIGNIFICANT CHANGE UP (ref 10.3–14.5)
SODIUM SERPL-SCNC: 141 MMOL/L — SIGNIFICANT CHANGE UP (ref 135–145)
WBC # BLD: 5.83 K/UL — SIGNIFICANT CHANGE UP (ref 3.8–10.5)
WBC # FLD AUTO: 5.83 K/UL — SIGNIFICANT CHANGE UP (ref 3.8–10.5)

## 2024-02-02 PROCEDURE — 99152 MOD SED SAME PHYS/QHP 5/>YRS: CPT

## 2024-02-02 PROCEDURE — 93454 CORONARY ARTERY ANGIO S&I: CPT | Mod: 26

## 2024-02-02 PROCEDURE — 80053 COMPREHEN METABOLIC PANEL: CPT

## 2024-02-02 PROCEDURE — C1894: CPT

## 2024-02-02 PROCEDURE — C1887: CPT

## 2024-02-02 PROCEDURE — C1769: CPT

## 2024-02-02 PROCEDURE — 85027 COMPLETE CBC AUTOMATED: CPT

## 2024-02-02 PROCEDURE — 93454 CORONARY ARTERY ANGIO S&I: CPT

## 2024-02-02 PROCEDURE — 93005 ELECTROCARDIOGRAM TRACING: CPT

## 2024-02-02 PROCEDURE — 99222 1ST HOSP IP/OBS MODERATE 55: CPT

## 2024-02-02 PROCEDURE — 36415 COLL VENOUS BLD VENIPUNCTURE: CPT

## 2024-02-02 PROCEDURE — 93010 ELECTROCARDIOGRAM REPORT: CPT

## 2024-02-02 RX ORDER — ALLOPURINOL 300 MG
1 TABLET ORAL
Refills: 0 | DISCHARGE

## 2024-02-02 RX ORDER — SODIUM CHLORIDE 9 MG/ML
250 INJECTION INTRAMUSCULAR; INTRAVENOUS; SUBCUTANEOUS ONCE
Refills: 0 | Status: DISCONTINUED | OUTPATIENT
Start: 2024-02-02 | End: 2024-02-16

## 2024-02-02 RX ORDER — SODIUM CHLORIDE 9 MG/ML
1000 INJECTION INTRAMUSCULAR; INTRAVENOUS; SUBCUTANEOUS
Refills: 0 | Status: DISCONTINUED | OUTPATIENT
Start: 2024-02-02 | End: 2024-02-16

## 2024-02-02 RX ORDER — OMEPRAZOLE 10 MG/1
1 CAPSULE, DELAYED RELEASE ORAL
Refills: 0 | DISCHARGE

## 2024-02-02 NOTE — CONSULT NOTE ADULT - SUBJECTIVE AND OBJECTIVE BOX
CHIEF COMPLAINT:  Here for cardiac catheterization     HISTORY OF PRESENT ILLNESS:  72 year old M w pmh of gout , kidney stones, seasonal allergies who presents for a [facility] with Dr. Trevizo. He states that he has kidney stones and prior to a kidney stone extraction he was recommended to get cardiac clearance. Per the patient his echo was normal and on Holter moniter he was found to have 8% PVC burden. He also had an abnormal stress test which he states had to be stopped due to patient having ventricular tachycardia? He presents very anxious about the procedure. I spent 30 minutes with him discussing benefits and risks of undergoing cardiac cath.     He currently denies chest pain, SOB, dizziness, lightheadedness. He states he is normally very active as he takes care of his elderly mother.       Allergies    penicillins (Unknown)    Intolerances    	    MEDICATIONS:  Allopurinol 300mg qd  Omeprazole 20mg qd  odium chloride 0.9% Bolus 250 milliLiter(s) IV Bolus once  sodium chloride 0.9%. 1000 milliLiter(s) IV Continuous <Continuous>      PAST MEDICAL & SURGICAL HISTORY:  Gout    Kidney stone    Calculus of ureter    Barretts esophagus    History of petit-mal seizures    History of diverticulosis    History of endoscopy    History of colonoscopy      FAMILY HISTORY:  Family history of kidney stone (Grandparent)    Family history of CVA (Father)        SOCIAL HISTORY:    Non-smoker  Non ETOH      REVIEW OF SYSTEMS:  See HPI. Otherwise, 12 point ROS done and otherwise negative.    PHYSICAL EXAM:  T(C): 36.7 (02-02-24 @ 11:54), Max: 36.7 (02-02-24 @ 10:53)  HR: 77 (02-02-24 @ 16:40) (72 - 88)  BP: 163/88 (02-02-24 @ 16:40) (145/81 - 165/95)  RR: 16 (02-02-24 @ 16:40) (16 - 16)  SpO2: 96% (02-02-24 @ 16:40) (94% - 96%)  Wt(kg): --  I&O's Summary      Appearance: Normal, in NAD  Head: normocephalic, atraumatic  Neck: supple  Cardiovascular: RRR S1 S2, no m/r/g  Respiratory: Lungs clear to auscultation	  Psychiatry: A & O x 3, Mood & affect appropriate  Gastrointestinal:  Soft, Non-tender, + BS	  : voids  Skin: No rashes, No ecchymoses  Neurologic: Non-focal  Extremities: Normal range of motion, No clubbing, cyanosis or edema  Vascular: Peripheral pulses palpable 2+ bilaterally        LABS:	 	    CBC Full  -  ( 02 Feb 2024 11:38 )  WBC Count : 5.83 K/uL  Hemoglobin : 14.8 g/dL  Hematocrit : 44.4 %  Platelet Count - Automated : 197 K/uL  Mean Cell Volume : 93.3 fl  Mean Cell Hemoglobin : 31.1 pg  Mean Cell Hemoglobin Concentration : 33.3 gm/dL  Auto Neutrophil # : x  Auto Lymphocyte # : x  Auto Monocyte # : x  Auto Eosinophil # : x  Auto Basophil # : x  Auto Neutrophil % : x  Auto Lymphocyte % : x  Auto Monocyte % : x  Auto Eosinophil % : x  Auto Basophil % : x    02-02    141  |  105  |  14  ----------------------------<  108<H>  3.9   |  22  |  0.91    Ca    9.4      02 Feb 2024 11:38    TPro  7.5  /  Alb  4.3  /  TBili  1.2  /  DBili  x   /  AST  16  /  ALT  17  /  AlkPhos  76  02-02      proBNP:   Lipid Profile:   HgA1c:   TSH:       CARDIAC MARKERS:      TELEMETRY: NSR 70-80s  	    ECG:  	 CHIEF COMPLAINT:  Here for cardiac catheterization     HISTORY OF PRESENT ILLNESS:  72 year old M w pmh of gout , kidney stones, seasonal allergies who presents for a [facility] with Dr. Trevizo. He states that he has kidney stones and prior to a kidney stone extraction he was recommended to get cardiac clearance. Per the patient his echo was normal and on Holter moniter he was found to have 8% PVC burden. He also had an abnormal stress test which he states had to be stopped due to patient having ventricular tachycardia? He presents very anxious about the procedure. I spent 30 minutes with him discussing benefits and risks of undergoing cardiac cath.     He currently denies chest pain, SOB, dizziness, lightheadedness. He states he is normally very active as he takes care of his elderly mother.       Allergies    penicillins (Unknown)    Intolerances    	  MEDICATIONS:  Allopurinol 300mg qd  Omeprazole 20mg qd  odium chloride 0.9% Bolus 250 milliLiter(s) IV Bolus once  sodium chloride 0.9%. 1000 milliLiter(s) IV Continuous <Continuous>      PAST MEDICAL & SURGICAL HISTORY:  Gout    Kidney stone    Calculus of ureter    Barretts esophagus    History of petit-mal seizures    History of diverticulosis    History of endoscopy    History of colonoscopy      FAMILY HISTORY:  Family history of kidney stone (Grandparent)    Family history of CVA (Father)      SOCIAL HISTORY:    Non-smoker  Non ETOH      REVIEW OF SYSTEMS:  See HPI. Otherwise, 12 point ROS done and otherwise negative.    PHYSICAL EXAM:  T(C): 36.7 (02-02-24 @ 11:54), Max: 36.7 (02-02-24 @ 10:53)  HR: 77 (02-02-24 @ 16:40) (72 - 88)  BP: 163/88 (02-02-24 @ 16:40) (145/81 - 165/95)  RR: 16 (02-02-24 @ 16:40) (16 - 16)  SpO2: 96% (02-02-24 @ 16:40) (94% - 96%)  Wt(kg): --  I&O's Summary      Appearance: Normal, in NAD  Head: normocephalic, atraumatic  Neck: supple  Cardiovascular: RRR S1 S2, no m/r/g  Respiratory: Lungs clear to auscultation	  Psychiatry: A & O x 3, Mood & affect appropriate  Gastrointestinal:  Soft, Non-tender, + BS	  : voids  Skin: No rashes, No ecchymoses  Neurologic: Non-focal  Extremities: Normal range of motion, no c/c/e  Vascular: Peripheral pulses palpable 2+ bilaterally        LABS:	 	    CBC Full  -  ( 02 Feb 2024 11:38 )  WBC Count : 5.83 K/uL  Hemoglobin : 14.8 g/dL  Hematocrit : 44.4 %  Platelet Count - Automated : 197 K/uL  Mean Cell Volume : 93.3 fl  Mean Cell Hemoglobin : 31.1 pg  Mean Cell Hemoglobin Concentration : 33.3 gm/dL  Auto Neutrophil # : x  Auto Lymphocyte # : x  Auto Monocyte # : x  Auto Eosinophil # : x  Auto Basophil # : x  Auto Neutrophil % : x  Auto Lymphocyte % : x  Auto Monocyte % : x  Auto Eosinophil % : x  Auto Basophil % : x    02-02    141  |  105  |  14  ----------------------------<  108<H>  3.9   |  22  |  0.91    Ca    9.4      02 Feb 2024 11:38    TPro  7.5  /  Alb  4.3  /  TBili  1.2  /  DBili  x   /  AST  16  /  ALT  17  /  AlkPhos  76  02-02      proBNP:   Lipid Profile:   HgA1c:   TSH:       CARDIAC MARKERS:      TELEMETRY: NSR 70-80s  	    ECG 1/3/24: NSR @ 60 bpm, PRUDENCE 194ms, narrow QRSd 86ms    Cardiac Cath 2/2/24:    Diagnostic Conclusions:   The coronary anatomy is normal.     Diagnostic Findings:     Coronary Angiography   The coronary circulation is right dominant.      LM   Left main artery: Angiography shows no disease.      LAD   Left anterior descending artery: Angiography shows no disease.      CX   Circumflex: Angiography shows no disease.      RCA   Right coronary artery: Angiography shows no disease.       CHIEF COMPLAINT:  Here for cardiac catheterization     HISTORY OF PRESENT ILLNESS:  69 year old M w pmh of gout , kidney stones, seasonal allergies who presents for a [facility] with Dr. Trevizo. He states that he has kidney stones and prior to a kidney stone extraction he was recommended to get cardiac clearance. Per the patient his echo was normal and on Holter moniter he was found to have 8% PVC burden. He also had an abnormal stress test which he states had to be stopped due to patient having ventricular tachycardia? He presents very anxious about the procedure. I spent 30 minutes with him discussing benefits and risks of undergoing cardiac cath.     He currently denies chest pain, SOB, dizziness, lightheadedness. He states he is normally very active as he takes care of his elderly mother.       Allergies    penicillins (Unknown)    Intolerances    	  MEDICATIONS:  Allopurinol 300mg qd  Omeprazole 20mg qd  odium chloride 0.9% Bolus 250 milliLiter(s) IV Bolus once  sodium chloride 0.9%. 1000 milliLiter(s) IV Continuous <Continuous>      PAST MEDICAL & SURGICAL HISTORY:  Gout    Kidney stone    Calculus of ureter    Barretts esophagus    History of petit-mal seizures    History of diverticulosis    History of endoscopy    History of colonoscopy      FAMILY HISTORY:  Family history of kidney stone (Grandparent)    Family history of CVA (Father)      SOCIAL HISTORY:    Non-smoker  Non ETOH      REVIEW OF SYSTEMS:  See HPI. Otherwise, 12 point ROS done and otherwise negative.    PHYSICAL EXAM:  T(C): 36.7 (02-02-24 @ 11:54), Max: 36.7 (02-02-24 @ 10:53)  HR: 77 (02-02-24 @ 16:40) (72 - 88)  BP: 163/88 (02-02-24 @ 16:40) (145/81 - 165/95)  RR: 16 (02-02-24 @ 16:40) (16 - 16)  SpO2: 96% (02-02-24 @ 16:40) (94% - 96%)  Wt(kg): --  I&O's Summary      Appearance: Normal, in NAD  Head: normocephalic, atraumatic  Neck: supple  Cardiovascular: RRR S1 S2, no m/r/g  Respiratory: Lungs clear to auscultation	  Psychiatry: A & O x 3, Mood & affect appropriate  Gastrointestinal:  Soft, Non-tender, + BS	  : voids  Skin: No rashes, No ecchymoses  Neurologic: Non-focal  Extremities: Normal range of motion, no c/c/e  Vascular: Peripheral pulses palpable 2+ bilaterally        LABS:	 	    CBC Full  -  ( 02 Feb 2024 11:38 )  WBC Count : 5.83 K/uL  Hemoglobin : 14.8 g/dL  Hematocrit : 44.4 %  Platelet Count - Automated : 197 K/uL  Mean Cell Volume : 93.3 fl  Mean Cell Hemoglobin : 31.1 pg  Mean Cell Hemoglobin Concentration : 33.3 gm/dL  Auto Neutrophil # : x  Auto Lymphocyte # : x  Auto Monocyte # : x  Auto Eosinophil # : x  Auto Basophil # : x  Auto Neutrophil % : x  Auto Lymphocyte % : x  Auto Monocyte % : x  Auto Eosinophil % : x  Auto Basophil % : x    02-02    141  |  105  |  14  ----------------------------<  108<H>  3.9   |  22  |  0.91    Ca    9.4      02 Feb 2024 11:38    TPro  7.5  /  Alb  4.3  /  TBili  1.2  /  DBili  x   /  AST  16  /  ALT  17  /  AlkPhos  76  02-02      proBNP:   Lipid Profile:   HgA1c:   TSH:       CARDIAC MARKERS:      TELEMETRY: NSR 70-80s  	    ECG 1/3/24: NSR @ 60 bpm, PRUDENCE 194ms, narrow QRSd 86ms    Cardiac Cath 2/2/24:    Diagnostic Conclusions:   The coronary anatomy is normal.     Diagnostic Findings:     Coronary Angiography   The coronary circulation is right dominant.      LM   Left main artery: Angiography shows no disease.      LAD   Left anterior descending artery: Angiography shows no disease.      CX   Circumflex: Angiography shows no disease.      RCA   Right coronary artery: Angiography shows no disease.       CHIEF COMPLAINT:  Here for cardiac catheterization     HISTORY OF PRESENT ILLNESS:  69 year old M w pmh of gout , kidney stones, seasonal allergies who presents for a [facility] with Dr. Trevizo. He states that he has kidney stones and prior to a kidney stone extraction he was recommended to get cardiac clearance. Per the patient his echo was normal and on Holter monitor he was found to have 8% PVC burden. He also had an abnormal stress test which he states had to be stopped due to patient having ventricular tachycardia? He presents very anxious about the procedure.    He currently denies chest pain, SOB, dizziness, lightheadedness. He states he is normally very active as he takes care of his elderly mother.       Allergies    penicillins (Unknown)    Intolerances    	  MEDICATIONS:  Allopurinol 300mg qd  Omeprazole 20mg qd  odium chloride 0.9% Bolus 250 milliLiter(s) IV Bolus once  sodium chloride 0.9%. 1000 milliLiter(s) IV Continuous <Continuous>      PAST MEDICAL & SURGICAL HISTORY:  Gout    Kidney stone    Calculus of ureter    Barretts esophagus    History of petit-mal seizures    History of diverticulosis    History of endoscopy    History of colonoscopy      FAMILY HISTORY:  Family history of kidney stone (Grandparent)    Family history of CVA (Father)      SOCIAL HISTORY:    Non-smoker  Non ETOH      REVIEW OF SYSTEMS:  See HPI. Otherwise, 12 point ROS done and otherwise negative.    PHYSICAL EXAM:  T(C): 36.7 (02-02-24 @ 11:54), Max: 36.7 (02-02-24 @ 10:53)  HR: 77 (02-02-24 @ 16:40) (72 - 88)  BP: 163/88 (02-02-24 @ 16:40) (145/81 - 165/95)  RR: 16 (02-02-24 @ 16:40) (16 - 16)  SpO2: 96% (02-02-24 @ 16:40) (94% - 96%)  Wt(kg): --  I&O's Summary      Appearance: Normal, in NAD  Head: normocephalic, atraumatic  Neck: supple  Cardiovascular: RRR S1 S2, no m/r/g  Respiratory: Lungs clear to auscultation	  Psychiatry: A & O x 3, Mood & affect appropriate  Gastrointestinal:  Soft, Non-tender, + BS	  : voids  Skin: No rashes, No ecchymoses  Neurologic: Non-focal  Extremities: Normal range of motion, no c/c/e  Vascular: Peripheral pulses palpable 2+ bilaterally        LABS:	 	    CBC Full  -  ( 02 Feb 2024 11:38 )  WBC Count : 5.83 K/uL  Hemoglobin : 14.8 g/dL  Hematocrit : 44.4 %  Platelet Count - Automated : 197 K/uL  Mean Cell Volume : 93.3 fl  Mean Cell Hemoglobin : 31.1 pg  Mean Cell Hemoglobin Concentration : 33.3 gm/dL  Auto Neutrophil # : x  Auto Lymphocyte # : x  Auto Monocyte # : x  Auto Eosinophil # : x  Auto Basophil # : x  Auto Neutrophil % : x  Auto Lymphocyte % : x  Auto Monocyte % : x  Auto Eosinophil % : x  Auto Basophil % : x    02-02    141  |  105  |  14  ----------------------------<  108<H>  3.9   |  22  |  0.91    Ca    9.4      02 Feb 2024 11:38    TPro  7.5  /  Alb  4.3  /  TBili  1.2  /  DBili  x   /  AST  16  /  ALT  17  /  AlkPhos  76  02-02      proBNP:   Lipid Profile:   HgA1c:   TSH:       CARDIAC MARKERS:      TELEMETRY: NSR 70-80s  	    ECG 1/3/24: NSR @ 60 bpm, PRUDENCE 194ms, narrow QRSd 86ms    Cardiac Cath 2/2/24:    Diagnostic Conclusions:   The coronary anatomy is normal.     Diagnostic Findings:     Coronary Angiography   The coronary circulation is right dominant.      LM   Left main artery: Angiography shows no disease.      LAD   Left anterior descending artery: Angiography shows no disease.      CX   Circumflex: Angiography shows no disease.      RCA   Right coronary artery: Angiography shows no disease.

## 2024-02-02 NOTE — ASU PATIENT PROFILE, ADULT - FALL HARM RISK - PATIENT NEEDS ASSISTANCE
[Consideration for Non-Curative Therapy] : consideration for non-curative therapy for [Bone Metastasis] : bone metastasis [Lung Cancer] : lung cancer No assistance needed

## 2024-02-02 NOTE — H&P CARDIOLOGY - HISTORY OF PRESENT ILLNESS
72 year old M w pmh of gout , kidney stones, seasonal allergies who presents for a [facility] with Dr. Trevizo. He states that he has kidney stones and prior to a kidney stone extraction he was recommended to get cardiac clearance. Per the patient his echo was normal and on Holter moniter he was found to have 8% PVC burden. He also had an abnormal stress test which he states had to be stopped due to patient having ventricular tachycardia? He presents very anxious about the procedure. I spent 30 minutes with him discussing benefits and risks of undergoing cardiac cath.     He currently denies chest pain, SOB, dizziness, lightheadedness. He states he is normally very active as he takes care of his elderly mother.

## 2024-02-02 NOTE — ASU DISCHARGE PLAN (ADULT/PEDIATRIC) - CARE PROVIDER_API CALL
Albert Puente  Cardiovascular Disease  [address] [address]  Phone: [phone]  Fax: [phone]  Follow Up Time: 2 weeks

## 2024-02-02 NOTE — CONSULT NOTE ADULT - NS ATTEND AMEND GEN_ALL_CORE FT
Patient seen at bedside. Holter monitor documents that PVC burden is < 8%. PVCs on 12-lead are right bundle, right inferior axis with positive precordial concordance. No further workup is needed from an EP perspective at this time. I recommended that he follow-up with me as an outpatient to obtain a more detailed assessment of his PVC burden and discuss indications for treating PVCs along with possible treatment approaches.     ALICIA Bolivar

## 2024-02-02 NOTE — CONSULT NOTE ADULT - ASSESSMENT
68 y/o male with PMH of HTN, HLD, Gout, Kidney Stones, seasonal allergies, presents for [facility] for cardiac clearance for kidney stone extraction. Patient reported having a normal echocardiogram, holter monitor showed 8% PVC burden, triplet, trigeminy. He went for stress test which was terminated due to increased PVC/Ventricular Bigeminy. EPS consulted.    1. PVC/Bigeminy 68 y/o male with PMH of HTN, HLD, Gout, Kidney Stones, seasonal allergies, presents for [facility] for cardiac clearance for kidney stone extraction. Patient reported having a normal echocardiogram, holter monitor showed <8% PVC burden, triplet, trigeminy. He went for stress test which was terminated due to increased PVC/Ventricular Bigeminy per cardiology (Dr Puente). EPS consulted for prior to planned procedure    1. PVC    - Holter monitor reviewed, showed PVCs, triplet, bigeminy  - Cath today without CAD  - PVC morphology right bundle, inferiorly directed, patient asymptomatic  - Seen with EP attdg, f/u as outpatient with Dr Bolivar   - Can have kidney stone extraction and d/c home today    #[id number]

## 2024-02-02 NOTE — ASU PATIENT PROFILE, ADULT - FALL HARM RISK - UNIVERSAL INTERVENTIONS
Bed in lowest position, wheels locked, appropriate side rails in place/Call bell, personal items and telephone in reach/Instruct patient to call for assistance before getting out of bed or chair/Non-slip footwear when patient is out of bed/[place] to call system/Physically safe environment - no spills, clutter or unnecessary equipment/Purposeful Proactive Rounding/Room/bathroom lighting operational, light cord in reach

## 2024-02-06 ENCOUNTER — OUTPATIENT (OUTPATIENT)
Dept: OUTPATIENT SERVICES | Facility: HOSPITAL | Age: 69
LOS: 1 days | End: 2024-02-06
Payer: MEDICARE

## 2024-02-06 ENCOUNTER — APPOINTMENT (OUTPATIENT)
Dept: CT IMAGING | Facility: CLINIC | Age: 69
End: 2024-02-06
Payer: MEDICARE

## 2024-02-06 DIAGNOSIS — Z98.890 OTHER SPECIFIED POSTPROCEDURAL STATES: Chronic | ICD-10-CM

## 2024-02-06 DIAGNOSIS — N20.0 CALCULUS OF KIDNEY: ICD-10-CM

## 2024-02-06 PROCEDURE — 74176 CT ABD & PELVIS W/O CONTRAST: CPT

## 2024-02-06 PROCEDURE — 74176 CT ABD & PELVIS W/O CONTRAST: CPT | Mod: 26

## 2024-02-08 ENCOUNTER — APPOINTMENT (OUTPATIENT)
Dept: CT IMAGING | Facility: CLINIC | Age: 69
End: 2024-02-08

## 2024-02-12 ENCOUNTER — APPOINTMENT (OUTPATIENT)
Dept: ELECTROPHYSIOLOGY | Facility: CLINIC | Age: 69
End: 2024-02-12

## 2024-04-09 ENCOUNTER — APPOINTMENT (OUTPATIENT)
Dept: UROLOGY | Facility: CLINIC | Age: 69
End: 2024-04-09
Payer: MEDICARE

## 2024-04-09 DIAGNOSIS — N20.0 CALCULUS OF KIDNEY: ICD-10-CM

## 2024-04-09 DIAGNOSIS — N20.1 CALCULUS OF URETER: ICD-10-CM

## 2024-04-09 PROCEDURE — 99442: CPT

## 2024-04-09 NOTE — ASSESSMENT
[FreeTextEntry1] : D/w pt today, refreshing diet mod, potential to repeat 24 hour urine in f/u Cont on allopurinol 300 mg (for gout, but is empiric risk-decreaser for stones) Will observe current stones, pt preference  RTC approx 4 to 5 months with renal US

## 2024-04-09 NOTE — HISTORY OF PRESENT ILLNESS
[Other Location: e.g. School (Enter Location, City,State)___] : at [unfilled], at the time of the visit. [Other Location: e.g. Home (Enter Location, City,State)___] : at [unfilled] [Verbal consent obtained from patient] : the patient, [unfilled] [FreeTextEntry1] : The patient-doctor relationship has been established in audio HIPAA compliant communication. The patient's identity has been confirmed. The patient was previously emailed a copy of the telemedicine consent. They have had a chance to review and has now given verbal consent and has requested care to be assessed and treated via telemedicine. They understand there may be limitations in this process, and that they may need further followup care in the office and/or hospital settings.  Verbal consent given on Apr 9 2024 10:40AM  GUERO GUSTAFSON is a 69 year M who presents for f/u: has had persistent right distal ureter stone. Was scheduled for right URS, canceled and deferred for additional clearance. Saw Dr. Puente. Pt not having pain at this time, has not seen stone pass, but thinks he may have passed it when he had some episodes of incontinence. No further pain. Was persistent on f/u CT scan 12/26/23.  prior 24 hour urines okay 2021.  No new pain. Feels back to normal as far as voiding sx.  04/09/2024   The patient denies fevers, chills, nausea and/or vomiting, and no unexplained weight loss.  All pertinent parts of the patient PFSH (past medical, family, and social histories), laboratory, radiological studies and available physician notes were reviewed prior to starting the face-to-face portion of the telemedicine visit. Questionnaire results, where appropriate, were discussed with the patient. [None] : no symptoms

## 2024-04-18 ENCOUNTER — APPOINTMENT (OUTPATIENT)
Dept: ELECTROPHYSIOLOGY | Facility: CLINIC | Age: 69
End: 2024-04-18
Payer: MEDICARE

## 2024-04-18 ENCOUNTER — NON-APPOINTMENT (OUTPATIENT)
Age: 69
End: 2024-04-18

## 2024-04-18 VITALS
WEIGHT: 197.25 LBS | SYSTOLIC BLOOD PRESSURE: 132 MMHG | DIASTOLIC BLOOD PRESSURE: 83 MMHG | BODY MASS INDEX: 29.55 KG/M2 | HEART RATE: 66 BPM | HEIGHT: 68.5 IN | OXYGEN SATURATION: 98 %

## 2024-04-18 PROCEDURE — 93000 ELECTROCARDIOGRAM COMPLETE: CPT

## 2024-04-18 PROCEDURE — 99214 OFFICE O/P EST MOD 30 MIN: CPT | Mod: 25

## 2024-04-18 NOTE — HISTORY OF PRESENT ILLNESS
[FreeTextEntry1] : Mr. Juanito Deleon is a 69-year-old man with gout, nephrolithiasis and premature ventricular contractions. He presents today for follow-up.  To briefly summarize his history, he underwent a coronary angiogram at [facility] in February of 2024 prior to a surgical procedure to treat nephrolithiasis. He had evidence of premature ventricular contractions (with a reported 8% burden). A stress test performed prior to the angiogram in January of 2024 was terminated due to premature ventricular contractions and non-sustained ventricular tachycardia.   The patient denies having any symptoms. He denies chest pain, shortness of breath, palpitations, dizziness, lightheadedness, pre-syncope or syncope. The morphology of his premature ventricular contractions was right bundle, right inferior axis, positive precordial concordance.

## 2024-04-18 NOTE — CARDIOLOGY SUMMARY
[de-identified] : ECG from 4/18/2024: Sinus rhythm with frequent PVCs (right bundle, right inferior axis, positive precordial concordance) ECG from 2/2/2024 (personally reviewed): Sinus with PVCs - R bundle, right inferior axis, positive precordial concordance [de-identified] : CardioDiagnostic MCT from 1/8/2024-1/11/2024 (personally reviewed): min HR: 37, max HR: 93, mean HR: 70, no AF, PVC burden not listed, sinus pauses noted per report [de-identified] : Stress Echo from 1/11/2024: duration 2:47, peak HR: 122, PVCs and NSVT - test terminated due to this. Echo demonstrated normal wall motion with adequate increase in contractility with good augmentation in LVSF in response to exercise.  [de-identified] : TTE from 1/8/2024: normal LVSF [de-identified] : [facility] from 2/2/2024: Dr. Melvin Trevizo: normal coronary anatomy

## 2024-04-19 ENCOUNTER — NON-APPOINTMENT (OUTPATIENT)
Age: 69
End: 2024-04-19

## 2024-04-29 NOTE — HISTORY OF PRESENT ILLNESS
[FreeTextEntry1] : 01/15/2024 - Office visit: PCP: Dr. Shayne Villatoro Past medical history notable for Cobb's esophagus, gout, nephrolithiasis. He was noted to have a low heart rate (44 bpm) during presurgical testing for laser surgery for nephrolithiasis. He saw Dr. Albert Puente recently for that.  Dr. Puente noted 1 pause during exam - "?sinus node exit block" as per his note.  Workup included:  TTE (1//8/2024): LVEF 68%.  LV diastolic dysfunction. Stress echocardiogram (1/11/2024): Exercised 2:47, 4.6 METS, NSVT, frequent VPCs (prompting termination of test), HR increased from 67 to 222 bpm, Normal increase in contractility with exercise, Test reported as "inconclusive." He recommended cardiac catheterization, EP consult, MCT, carotid Doppler. The patient has a several year history of stable and rare "pinching" pains under his left breast, lasting several seconds, never related to exertion. For many years, he has experienced lightheadedness (more of a headache feeling) when he does not get enough sleep or when he is under pressure. He denies SOB, palpitations. He is on no cardiac medications. He has not exercised for several months. He was a 1 pack/day smoker for 5 to 10 years, has not smoked since 1984. He is a , has not worked for 5 years He has had nasal congestion the last several days.   His family history is notable for his father s/p CVA at 72 years old and his sister who has slow heart rates.

## 2024-04-29 NOTE — DISCUSSION/SUMMARY
[FreeTextEntry1] : PLAN (ADDITIONAL): Watch diet Watch salt His 10-year ASCVD risk is 15% - discuss statin. F/u with PCP demetrius mohan. Will review notes - consider CCTA  VITAL SIGNS: BP: 134/72, 150/76  PHYSICAL EXAMINATION: Constitutional: well developed/well nourished, no acute distress Eyes: no xanthelasma ENT: no oral cyanosis Neck: without jugular venous distention, without carotid bruits Cardiovascular: regular rhythm, normal S1 and S2, grade 1/6 systolic murmur Respiratory: no respiratory distress, lungs clear to auscultation bilaterally Abdomen: soft, non-tender, no abdominal mass palpated, no hepatosplenomegaly, bowel sounds normal Extremities: without clubbing, cyanosis, edema, or tenderness Musculoskeletal: gait sufficient for exercise testing Neurologic: alert and oriented X 3 Psych: affect normal

## 2024-06-17 RX ORDER — OMEPRAZOLE 20 MG/1
20 CAPSULE, DELAYED RELEASE ORAL DAILY
Qty: 1 | Refills: 5 | Status: ACTIVE | COMMUNITY
Start: 2020-07-23 | End: 1900-01-01

## 2024-07-01 ENCOUNTER — NON-APPOINTMENT (OUTPATIENT)
Age: 69
End: 2024-07-01

## 2024-07-01 ENCOUNTER — APPOINTMENT (OUTPATIENT)
Dept: CARDIOLOGY | Facility: CLINIC | Age: 69
End: 2024-07-01
Payer: MEDICARE

## 2024-07-01 VITALS
WEIGHT: 198 LBS | HEART RATE: 50 BPM | SYSTOLIC BLOOD PRESSURE: 130 MMHG | DIASTOLIC BLOOD PRESSURE: 82 MMHG | OXYGEN SATURATION: 97 % | BODY MASS INDEX: 29.67 KG/M2

## 2024-07-01 DIAGNOSIS — I47.29 OTHER VENTRICULAR TACHYCARDIA: ICD-10-CM

## 2024-07-01 DIAGNOSIS — I49.5 SICK SINUS SYNDROME: ICD-10-CM

## 2024-07-01 DIAGNOSIS — I49.3 VENTRICULAR PREMATURE DEPOLARIZATION: ICD-10-CM

## 2024-07-01 PROCEDURE — 99215 OFFICE O/P EST HI 40 MIN: CPT | Mod: 25

## 2024-07-01 PROCEDURE — 93000 ELECTROCARDIOGRAM COMPLETE: CPT

## 2024-08-21 ENCOUNTER — APPOINTMENT (OUTPATIENT)
Dept: UROLOGY | Facility: CLINIC | Age: 69
End: 2024-08-21
Payer: MEDICARE

## 2024-08-21 ENCOUNTER — OUTPATIENT (OUTPATIENT)
Dept: OUTPATIENT SERVICES | Facility: HOSPITAL | Age: 69
LOS: 1 days | End: 2024-08-21
Payer: MEDICARE

## 2024-08-21 DIAGNOSIS — R35.0 FREQUENCY OF MICTURITION: ICD-10-CM

## 2024-08-21 DIAGNOSIS — Z98.890 OTHER SPECIFIED POSTPROCEDURAL STATES: Chronic | ICD-10-CM

## 2024-08-21 DIAGNOSIS — N20.1 CALCULUS OF URETER: ICD-10-CM

## 2024-08-21 DIAGNOSIS — N20.0 CALCULUS OF KIDNEY: ICD-10-CM

## 2024-08-21 DIAGNOSIS — M10.9 GOUT, UNSPECIFIED: ICD-10-CM

## 2024-08-21 PROCEDURE — 76770 US EXAM ABDO BACK WALL COMP: CPT | Mod: 26

## 2024-08-21 PROCEDURE — 76770 US EXAM ABDO BACK WALL COMP: CPT

## 2024-08-21 PROCEDURE — 99213 OFFICE O/P EST LOW 20 MIN: CPT | Mod: 25

## 2024-08-21 NOTE — PHYSICAL EXAM
[] : no respiratory distress [General Appearance - Well Developed] : well developed [Normal Station and Gait] : the gait and station were normal for the patient's age [Skin Color & Pigmentation] : normal skin color and pigmentation [Oriented To Time, Place, And Person] : oriented to person, place, and time

## 2024-08-26 NOTE — HISTORY OF PRESENT ILLNESS
[None] : None [FreeTextEntry1] : 69 year M who presents to follow up with kidney stones. Pt denies any voiding symptoms- no dysuria or hematuria.   HX  Was scheduled for right URS, canceled and deferred for additional clearance. Saw Dr. Puente. Pt not having pain at this time, has not seen stone pass, but thinks he may have passed it when he had some episodes of incontinence. No further pain. Was persistent on f/u CT scan 12/26/23.  CT 02/2024- The previous distal right ureter stone is no longer present. Bilateral Non obstructing nephrolithiasis again demonstrated. Left renal cyst.   prior 24 hour urines okay 2021.  PSA 3.46- 11/2024 3.35- 11/2024 3.76- 11/2021 3.59- 11/2021 3.9- 10/2020 4.09- 07/2020

## 2024-08-26 NOTE — ASSESSMENT
[FreeTextEntry1] : Renal US today reviewed- 3 mm echogenic focus noted in the right kidney lower pole and a 1.1 cm echogenic focus with shadow in left kidney lower pole. A 2.9 cm simple cyst in the left kidney upper pole. Both kidneys appear normal in size and echogenicity, no solid masses or hydronephrosis visualized.  kidney stone burden appears generally stable   PSA in November 2024.   plan  1) RTC in 6 months with Renal US

## 2024-08-27 DIAGNOSIS — N20.1 CALCULUS OF URETER: ICD-10-CM

## 2024-08-27 DIAGNOSIS — N20.0 CALCULUS OF KIDNEY: ICD-10-CM

## 2024-08-27 DIAGNOSIS — M10.9 GOUT, UNSPECIFIED: ICD-10-CM

## 2024-10-07 ENCOUNTER — NON-APPOINTMENT (OUTPATIENT)
Age: 69
End: 2024-10-07

## 2024-10-07 ENCOUNTER — APPOINTMENT (OUTPATIENT)
Dept: CARDIOLOGY | Facility: CLINIC | Age: 69
End: 2024-10-07
Payer: MEDICARE

## 2024-10-07 VITALS — SYSTOLIC BLOOD PRESSURE: 120 MMHG | DIASTOLIC BLOOD PRESSURE: 72 MMHG

## 2024-10-07 VITALS
WEIGHT: 198 LBS | OXYGEN SATURATION: 96 % | SYSTOLIC BLOOD PRESSURE: 108 MMHG | DIASTOLIC BLOOD PRESSURE: 70 MMHG | BODY MASS INDEX: 29.67 KG/M2 | HEART RATE: 72 BPM

## 2024-10-07 DIAGNOSIS — I47.29 OTHER VENTRICULAR TACHYCARDIA: ICD-10-CM

## 2024-10-07 DIAGNOSIS — T14.8XXA OTHER INJURY OF UNSPECIFIED BODY REGION, INITIAL ENCOUNTER: ICD-10-CM

## 2024-10-07 DIAGNOSIS — R23.8 OTHER SKIN CHANGES: ICD-10-CM

## 2024-10-07 DIAGNOSIS — J30.2 OTHER SEASONAL ALLERGIC RHINITIS: ICD-10-CM

## 2024-10-07 DIAGNOSIS — I49.5 SICK SINUS SYNDROME: ICD-10-CM

## 2024-10-07 DIAGNOSIS — I49.3 VENTRICULAR PREMATURE DEPOLARIZATION: ICD-10-CM

## 2024-10-07 PROCEDURE — 93000 ELECTROCARDIOGRAM COMPLETE: CPT

## 2024-10-07 PROCEDURE — 99214 OFFICE O/P EST MOD 30 MIN: CPT | Mod: 25

## 2024-11-13 ENCOUNTER — OFFICE (OUTPATIENT)
Age: 69
Setting detail: OPHTHALMOLOGY
End: 2024-11-13
Payer: MEDICAID

## 2024-11-13 DIAGNOSIS — H43.812: ICD-10-CM

## 2024-11-13 DIAGNOSIS — H35.371: ICD-10-CM

## 2024-11-13 DIAGNOSIS — H43.811: ICD-10-CM

## 2024-11-13 DIAGNOSIS — H35.40: ICD-10-CM

## 2024-11-13 DIAGNOSIS — H25.13: ICD-10-CM

## 2024-11-13 PROCEDURE — 92134 CPTRZ OPH DX IMG PST SGM RTA: CPT | Performed by: OPHTHALMOLOGY

## 2024-11-13 PROCEDURE — 92014 COMPRE OPH EXAM EST PT 1/>: CPT | Performed by: OPHTHALMOLOGY

## 2024-11-13 ASSESSMENT — REFRACTION_AUTOREFRACTION
OD_SPHERE: +3.25
OS_SPHERE: -2.25
OS_CYLINDER: -1.00
OD_CYLINDER: -2.25
OS_AXIS: 143
OD_AXIS: 027

## 2024-11-13 ASSESSMENT — REFRACTION_MANIFEST
OD_ADD: +2.50
OD_VA1: 20/20
OS_CYLINDER: SPHERE
OD_CYLINDER: -2.00
OD_AXIS: 25
OS_SPHERE: -3.00
OD_SPHERE: +2.75
OS_ADD: +2.50
OS_VA1: 20/20

## 2024-11-13 ASSESSMENT — KERATOMETRY
OS_K2POWER_DIOPTERS: 45.00
METHOD_AUTO_MANUAL: AUTO
OD_K2POWER_DIOPTERS: 45.00
OS_AXISANGLE_DEGREES: 067
OD_K1POWER_DIOPTERS: 43.25
OD_AXISANGLE_DEGREES: 111
OS_K1POWER_DIOPTERS: 44.25

## 2024-11-13 ASSESSMENT — REFRACTION_CURRENTRX
OS_VPRISM_DIRECTION: SV
OD_VPRISM_DIRECTION: SV
OS_CYLINDER: SPH
OD_CYLINDER: SPH
OS_SPHERE: -3.00
OD_SPHERE: +2.27
OD_AXIS: 026
OD_ADD: +0.25
OS_ADD: +0.25
OS_OVR_VA: 20/
OS_AXIS: 180
OD_OVR_VA: 20/

## 2024-11-13 ASSESSMENT — VISUAL ACUITY
OD_BCVA: 20/20-2
OS_BCVA: 20/30-1

## 2024-11-13 ASSESSMENT — TONOMETRY
OS_IOP_MMHG: 14
OD_IOP_MMHG: 14

## 2024-11-13 ASSESSMENT — CONFRONTATIONAL VISUAL FIELD TEST (CVF)
OD_FINDINGS: FULL
OS_FINDINGS: FULL

## 2024-11-14 ENCOUNTER — APPOINTMENT (OUTPATIENT)
Dept: INTERNAL MEDICINE | Facility: CLINIC | Age: 69
End: 2024-11-14
Payer: MEDICARE

## 2024-11-14 PROBLEM — H43.812 VITREOUS DEGENERATION; LEFT EYE: Status: ACTIVE | Noted: 2024-11-13

## 2024-11-14 LAB
25(OH)D3 SERPL-MCNC: 31.1 NG/ML
ALBUMIN SERPL ELPH-MCNC: 4.6 G/DL
ALP BLD-CCNC: 78 U/L
ALT SERPL-CCNC: 19 U/L
ANION GAP SERPL CALC-SCNC: 13 MMOL/L
AST SERPL-CCNC: 19 U/L
BASOPHILS # BLD AUTO: 0.04 K/UL
BASOPHILS NFR BLD AUTO: 0.7 %
BILIRUB SERPL-MCNC: 0.9 MG/DL
BUN SERPL-MCNC: 22 MG/DL
CALCIUM SERPL-MCNC: 9.6 MG/DL
CHLORIDE SERPL-SCNC: 102 MMOL/L
CHOLEST SERPL-MCNC: 181 MG/DL
CO2 SERPL-SCNC: 25 MMOL/L
CREAT SERPL-MCNC: 1.05 MG/DL
EGFR: 76 ML/MIN/1.73M2
EOSINOPHIL # BLD AUTO: 0.16 K/UL
EOSINOPHIL NFR BLD AUTO: 2.6 %
ERYTHROCYTE [SEDIMENTATION RATE] IN BLOOD BY WESTERGREN METHOD: 18 MM/HR
GLUCOSE SERPL-MCNC: 109 MG/DL
HCT VFR BLD CALC: 45.3 %
HDLC SERPL-MCNC: 49 MG/DL
HGB BLD-MCNC: 14.8 G/DL
IMM GRANULOCYTES NFR BLD AUTO: 0.3 %
LDLC SERPL CALC-MCNC: 109 MG/DL
LYMPHOCYTES # BLD AUTO: 1.4 K/UL
LYMPHOCYTES NFR BLD AUTO: 22.9 %
MAN DIFF?: NORMAL
MCHC RBC-ENTMCNC: 31.4 PG
MCHC RBC-ENTMCNC: 32.7 G/DL
MCV RBC AUTO: 96.2 FL
MONOCYTES # BLD AUTO: 0.48 K/UL
MONOCYTES NFR BLD AUTO: 7.8 %
NEUTROPHILS # BLD AUTO: 4.02 K/UL
NEUTROPHILS NFR BLD AUTO: 65.7 %
NONHDLC SERPL-MCNC: 132 MG/DL
PLATELET # BLD AUTO: 176 K/UL
POTASSIUM SERPL-SCNC: 4.6 MMOL/L
PROT SERPL-MCNC: 7.4 G/DL
PSA SERPL-MCNC: 4.6 NG/ML
RBC # BLD: 4.71 M/UL
RBC # FLD: 13.3 %
SODIUM SERPL-SCNC: 140 MMOL/L
T3 SERPL-MCNC: 126 NG/DL
T4 FREE SERPL-MCNC: 1.3 NG/DL
T4 SERPL-MCNC: 7.6 UG/DL
TRIGL SERPL-MCNC: 131 MG/DL
TSH SERPL-ACNC: 2.41 UIU/ML
WBC # FLD AUTO: 6.12 K/UL

## 2024-11-14 PROCEDURE — 36415 COLL VENOUS BLD VENIPUNCTURE: CPT

## 2024-11-21 ENCOUNTER — APPOINTMENT (OUTPATIENT)
Dept: INTERNAL MEDICINE | Facility: CLINIC | Age: 69
End: 2024-11-21
Payer: MEDICARE

## 2024-11-21 VITALS
HEIGHT: 68.5 IN | SYSTOLIC BLOOD PRESSURE: 141 MMHG | DIASTOLIC BLOOD PRESSURE: 84 MMHG | HEART RATE: 58 BPM | WEIGHT: 201 LBS | BODY MASS INDEX: 30.11 KG/M2 | OXYGEN SATURATION: 98 %

## 2024-11-21 DIAGNOSIS — F41.9 ANXIETY DISORDER, UNSPECIFIED: ICD-10-CM

## 2024-11-21 DIAGNOSIS — Z12.11 ENCOUNTER FOR SCREENING FOR MALIGNANT NEOPLASM OF COLON: ICD-10-CM

## 2024-11-21 DIAGNOSIS — M10.9 GOUT, UNSPECIFIED: ICD-10-CM

## 2024-11-21 DIAGNOSIS — K22.70 BARRETT'S ESOPHAGUS W/OUT DYSPLASIA: ICD-10-CM

## 2024-11-21 DIAGNOSIS — F32.A DEPRESSION, UNSPECIFIED: ICD-10-CM

## 2024-11-21 PROCEDURE — G0439: CPT

## 2024-11-21 PROCEDURE — 93000 ELECTROCARDIOGRAM COMPLETE: CPT

## 2024-11-21 PROCEDURE — 99214 OFFICE O/P EST MOD 30 MIN: CPT | Mod: 25

## 2024-12-07 ENCOUNTER — APPOINTMENT (OUTPATIENT)
Dept: GASTROENTEROLOGY | Facility: CLINIC | Age: 69
End: 2024-12-07
Payer: MEDICARE

## 2024-12-07 ENCOUNTER — LABORATORY RESULT (OUTPATIENT)
Age: 69
End: 2024-12-07

## 2024-12-07 PROCEDURE — 45385 COLONOSCOPY W/LESION REMOVAL: CPT

## 2024-12-07 PROCEDURE — 43239 EGD BIOPSY SINGLE/MULTIPLE: CPT

## 2025-01-02 ENCOUNTER — NON-APPOINTMENT (OUTPATIENT)
Age: 70
End: 2025-01-02

## 2025-01-02 ENCOUNTER — APPOINTMENT (OUTPATIENT)
Dept: CARDIOLOGY | Facility: CLINIC | Age: 70
End: 2025-01-02
Payer: MEDICARE

## 2025-01-02 VITALS
BODY MASS INDEX: 30.87 KG/M2 | WEIGHT: 206 LBS | DIASTOLIC BLOOD PRESSURE: 80 MMHG | SYSTOLIC BLOOD PRESSURE: 136 MMHG | HEART RATE: 59 BPM | OXYGEN SATURATION: 95 %

## 2025-01-02 DIAGNOSIS — I10 ESSENTIAL (PRIMARY) HYPERTENSION: ICD-10-CM

## 2025-01-02 DIAGNOSIS — I47.29 OTHER VENTRICULAR TACHYCARDIA: ICD-10-CM

## 2025-01-02 DIAGNOSIS — I49.5 SICK SINUS SYNDROME: ICD-10-CM

## 2025-01-02 DIAGNOSIS — I49.3 VENTRICULAR PREMATURE DEPOLARIZATION: ICD-10-CM

## 2025-01-02 PROCEDURE — 93000 ELECTROCARDIOGRAM COMPLETE: CPT

## 2025-01-02 PROCEDURE — 99214 OFFICE O/P EST MOD 30 MIN: CPT | Mod: 25

## 2025-02-19 ENCOUNTER — APPOINTMENT (OUTPATIENT)
Dept: UROLOGY | Facility: CLINIC | Age: 70
End: 2025-02-19
Payer: MEDICARE

## 2025-02-19 ENCOUNTER — OUTPATIENT (OUTPATIENT)
Dept: OUTPATIENT SERVICES | Facility: HOSPITAL | Age: 70
LOS: 1 days | End: 2025-02-19
Payer: MEDICARE

## 2025-02-19 ENCOUNTER — RESULT CHARGE (OUTPATIENT)
Age: 70
End: 2025-02-19

## 2025-02-19 DIAGNOSIS — Z98.890 OTHER SPECIFIED POSTPROCEDURAL STATES: Chronic | ICD-10-CM

## 2025-02-19 DIAGNOSIS — N40.0 BENIGN PROSTATIC HYPERPLASIA WITHOUT LOWER URINARY TRACT SYMPMS: ICD-10-CM

## 2025-02-19 DIAGNOSIS — R35.0 FREQUENCY OF MICTURITION: ICD-10-CM

## 2025-02-19 DIAGNOSIS — N20.0 CALCULUS OF KIDNEY: ICD-10-CM

## 2025-02-19 DIAGNOSIS — G47.33 OBSTRUCTIVE SLEEP APNEA (ADULT) (PEDIATRIC): ICD-10-CM

## 2025-02-19 LAB
BILIRUB UR QL STRIP: NEGATIVE
CLARITY UR: CLEAR
COLLECTION METHOD: NORMAL
GLUCOSE UR-MCNC: NEGATIVE
HCG UR QL: 0.2 EU/DL
HGB UR QL STRIP.AUTO: 1
KETONES UR-MCNC: NEGATIVE
LEUKOCYTE ESTERASE UR QL STRIP: NEGATIVE
NITRITE UR QL STRIP: NEGATIVE
PH UR STRIP: 6
PROT UR STRIP-MCNC: NEGATIVE
SP GR UR STRIP: 1.01

## 2025-02-19 PROCEDURE — 76770 US EXAM ABDO BACK WALL COMP: CPT | Mod: 26

## 2025-02-19 PROCEDURE — 99214 OFFICE O/P EST MOD 30 MIN: CPT | Mod: 25

## 2025-02-19 PROCEDURE — 76770 US EXAM ABDO BACK WALL COMP: CPT

## 2025-02-21 LAB — PSA SERPL-MCNC: 4.03 NG/ML

## 2025-02-25 DIAGNOSIS — N20.0 CALCULUS OF KIDNEY: ICD-10-CM

## 2025-02-25 DIAGNOSIS — N40.0 BENIGN PROSTATIC HYPERPLASIA WITHOUT LOWER URINARY TRACT SYMPTOMS: ICD-10-CM

## 2025-02-25 DIAGNOSIS — G47.33 OBSTRUCTIVE SLEEP APNEA (ADULT) (PEDIATRIC): ICD-10-CM

## 2025-03-27 ENCOUNTER — NON-APPOINTMENT (OUTPATIENT)
Age: 70
End: 2025-03-27

## 2025-03-27 ENCOUNTER — APPOINTMENT (OUTPATIENT)
Dept: PULMONOLOGY | Facility: CLINIC | Age: 70
End: 2025-03-27

## 2025-03-27 ENCOUNTER — APPOINTMENT (OUTPATIENT)
Dept: PULMONOLOGY | Facility: CLINIC | Age: 70
End: 2025-03-27
Payer: MEDICARE

## 2025-03-27 VITALS
DIASTOLIC BLOOD PRESSURE: 88 MMHG | RESPIRATION RATE: 16 BRPM | HEART RATE: 58 BPM | OXYGEN SATURATION: 98 % | SYSTOLIC BLOOD PRESSURE: 128 MMHG

## 2025-03-27 DIAGNOSIS — R29.818 OTHER SYMPTOMS AND SIGNS INVOLVING THE NERVOUS SYSTEM: ICD-10-CM

## 2025-03-27 PROCEDURE — ZZZZZ: CPT

## 2025-03-27 PROCEDURE — 99203 OFFICE O/P NEW LOW 30 MIN: CPT

## 2025-03-27 RX ORDER — PSYLLIUM HUSK 0.4 G
CAPSULE ORAL
Refills: 0 | Status: ACTIVE | COMMUNITY

## 2025-03-31 ENCOUNTER — APPOINTMENT (OUTPATIENT)
Dept: PULMONOLOGY | Facility: CLINIC | Age: 70
End: 2025-03-31
Payer: MEDICARE

## 2025-03-31 DIAGNOSIS — G47.33 OBSTRUCTIVE SLEEP APNEA (ADULT) (PEDIATRIC): ICD-10-CM

## 2025-03-31 PROCEDURE — 99212 OFFICE O/P EST SF 10 MIN: CPT | Mod: 2W

## 2025-05-02 NOTE — ED CDU PROVIDER SUBSEQUENT DAY NOTE - NS ED MD PROGRESS NOTE PROVIDER NAME FT
Received request for cardiac clearance from [facility] for Urology surgery.  Cardiac Clearance faxed. Confirmation fax receipt received.         
- Nohemy Jacobs PA-C

## 2025-06-16 ENCOUNTER — APPOINTMENT (OUTPATIENT)
Dept: ORTHOPEDIC SURGERY | Facility: CLINIC | Age: 70
End: 2025-06-16
Payer: MEDICARE

## 2025-06-16 VITALS
BODY MASS INDEX: 29.96 KG/M2 | HEIGHT: 68.5 IN | HEART RATE: 66 BPM | WEIGHT: 200 LBS | DIASTOLIC BLOOD PRESSURE: 78 MMHG | SYSTOLIC BLOOD PRESSURE: 135 MMHG

## 2025-06-16 PROCEDURE — 73564 X-RAY EXAM KNEE 4 OR MORE: CPT | Mod: RT

## 2025-06-16 PROCEDURE — 73502 X-RAY EXAM HIP UNI 2-3 VIEWS: CPT | Mod: RT

## 2025-06-16 PROCEDURE — 72100 X-RAY EXAM L-S SPINE 2/3 VWS: CPT

## 2025-06-16 PROCEDURE — 99214 OFFICE O/P EST MOD 30 MIN: CPT

## 2025-07-02 ENCOUNTER — APPOINTMENT (OUTPATIENT)
Dept: CARDIOLOGY | Facility: CLINIC | Age: 70
End: 2025-07-02
Payer: MEDICARE

## 2025-07-02 VITALS — DIASTOLIC BLOOD PRESSURE: 70 MMHG | SYSTOLIC BLOOD PRESSURE: 124 MMHG | OXYGEN SATURATION: 97 % | HEART RATE: 73 BPM

## 2025-07-02 PROCEDURE — 99214 OFFICE O/P EST MOD 30 MIN: CPT

## 2025-07-02 PROCEDURE — 93000 ELECTROCARDIOGRAM COMPLETE: CPT

## 2025-08-04 ENCOUNTER — APPOINTMENT (OUTPATIENT)
Dept: INFECTIOUS DISEASE | Facility: CLINIC | Age: 70
End: 2025-08-04
Payer: SELF-PAY

## 2025-08-04 DIAGNOSIS — Z71.84 ENC FOR HEALTH COUNSELING RELATED TO TRAVEL: ICD-10-CM

## 2025-08-04 PROCEDURE — 90471 IMMUNIZATION ADMIN: CPT | Mod: NC

## 2025-08-04 PROCEDURE — 90472 IMMUNIZATION ADMIN EACH ADD: CPT | Mod: NC

## 2025-08-04 PROCEDURE — 90717 YELLOW FEVER VACCINE SUBQ: CPT

## 2025-08-04 PROCEDURE — 90691 TYPHOID VACCINE IM: CPT

## 2025-08-04 PROCEDURE — 90632 HEPA VACCINE ADULT IM: CPT

## 2025-08-04 PROCEDURE — 99402 PREV MED CNSL INDIV APPRX 30: CPT

## 2025-08-04 RX ORDER — ATOVAQUONE AND PROGUANIL HYDROCHLORIDE 250; 100 MG/1; MG/1
250-100 TABLET, FILM COATED ORAL DAILY
Qty: 15 | Refills: 0 | Status: ACTIVE | COMMUNITY
Start: 2025-08-04 | End: 1900-01-01

## 2025-08-04 RX ORDER — AZITHROMYCIN 250 MG/1
250 TABLET, FILM COATED ORAL
Qty: 4 | Refills: 0 | Status: ACTIVE | COMMUNITY
Start: 2025-08-04 | End: 1900-01-01

## 2025-08-13 ENCOUNTER — APPOINTMENT (OUTPATIENT)
Dept: UROLOGY | Facility: CLINIC | Age: 70
End: 2025-08-13
Payer: MEDICARE

## 2025-08-13 ENCOUNTER — OUTPATIENT (OUTPATIENT)
Dept: OUTPATIENT SERVICES | Facility: HOSPITAL | Age: 70
LOS: 1 days | End: 2025-08-13
Payer: MEDICARE

## 2025-08-13 ENCOUNTER — LABORATORY RESULT (OUTPATIENT)
Age: 70
End: 2025-08-13

## 2025-08-13 ENCOUNTER — NON-APPOINTMENT (OUTPATIENT)
Age: 70
End: 2025-08-13

## 2025-08-13 VITALS — SYSTOLIC BLOOD PRESSURE: 128 MMHG | HEART RATE: 66 BPM | DIASTOLIC BLOOD PRESSURE: 89 MMHG | OXYGEN SATURATION: 98 %

## 2025-08-13 DIAGNOSIS — N20.0 CALCULUS OF KIDNEY: ICD-10-CM

## 2025-08-13 DIAGNOSIS — Z98.890 OTHER SPECIFIED POSTPROCEDURAL STATES: Chronic | ICD-10-CM

## 2025-08-13 DIAGNOSIS — N40.0 BENIGN PROSTATIC HYPERPLASIA WITHOUT LOWER URINARY TRACT SYMPMS: ICD-10-CM

## 2025-08-13 DIAGNOSIS — R35.0 FREQUENCY OF MICTURITION: ICD-10-CM

## 2025-08-13 DIAGNOSIS — R97.20 ELEVATED PROSTATE, SPECIFIC ANTIGEN [PSA]: ICD-10-CM

## 2025-08-13 LAB
ANION GAP SERPL CALC-SCNC: 13 MMOL/L
BUN SERPL-MCNC: 14 MG/DL
CALCIUM SERPL-MCNC: 9.7 MG/DL
CHLORIDE SERPL-SCNC: 102 MMOL/L
CO2 SERPL-SCNC: 24 MMOL/L
CREAT SERPL-MCNC: 1.08 MG/DL
EGFRCR SERPLBLD CKD-EPI 2021: 73 ML/MIN/1.73M2
GLUCOSE SERPL-MCNC: 76 MG/DL
POTASSIUM SERPL-SCNC: 4.9 MMOL/L
PSA FREE FLD-MCNC: 23 %
PSA FREE SERPL-MCNC: 0.97 NG/ML
PSA SERPL-MCNC: 4.26 NG/ML
SODIUM SERPL-SCNC: 138 MMOL/L

## 2025-08-13 PROCEDURE — 99214 OFFICE O/P EST MOD 30 MIN: CPT | Mod: 25

## 2025-08-13 PROCEDURE — 76770 US EXAM ABDO BACK WALL COMP: CPT | Mod: 26

## 2025-08-13 PROCEDURE — 76770 US EXAM ABDO BACK WALL COMP: CPT

## 2025-08-14 ENCOUNTER — NON-APPOINTMENT (OUTPATIENT)
Age: 70
End: 2025-08-14

## 2025-08-14 ENCOUNTER — APPOINTMENT (OUTPATIENT)
Dept: CT IMAGING | Facility: IMAGING CENTER | Age: 70
End: 2025-08-14
Payer: MEDICARE

## 2025-08-14 ENCOUNTER — APPOINTMENT (OUTPATIENT)
Dept: INTERNAL MEDICINE | Facility: CLINIC | Age: 70
End: 2025-08-14
Payer: MEDICARE

## 2025-08-14 ENCOUNTER — OUTPATIENT (OUTPATIENT)
Dept: OUTPATIENT SERVICES | Facility: HOSPITAL | Age: 70
LOS: 1 days | End: 2025-08-14
Payer: MEDICARE

## 2025-08-14 VITALS — DIASTOLIC BLOOD PRESSURE: 94 MMHG | SYSTOLIC BLOOD PRESSURE: 155 MMHG

## 2025-08-14 VITALS
WEIGHT: 207 LBS | SYSTOLIC BLOOD PRESSURE: 165 MMHG | BODY MASS INDEX: 31.01 KG/M2 | HEIGHT: 68.5 IN | OXYGEN SATURATION: 98 % | HEART RATE: 68 BPM | DIASTOLIC BLOOD PRESSURE: 94 MMHG

## 2025-08-14 DIAGNOSIS — R42 DIZZINESS AND GIDDINESS: ICD-10-CM

## 2025-08-14 DIAGNOSIS — N20.0 CALCULUS OF KIDNEY: ICD-10-CM

## 2025-08-14 DIAGNOSIS — R97.20 ELEVATED PROSTATE SPECIFIC ANTIGEN [PSA]: ICD-10-CM

## 2025-08-14 DIAGNOSIS — N40.0 BENIGN PROSTATIC HYPERPLASIA WITHOUT LOWER URINARY TRACT SYMPTOMS: ICD-10-CM

## 2025-08-14 LAB
BASOPHILS # BLD AUTO: 0.02 K/UL
BASOPHILS NFR BLD AUTO: 0.5 %
EOSINOPHIL # BLD AUTO: 0.06 K/UL
EOSINOPHIL NFR BLD AUTO: 1.5 %
HCT VFR BLD CALC: 43.1 %
HGB BLD-MCNC: 14.2 G/DL
IMM GRANULOCYTES NFR BLD AUTO: 0.3 %
LYMPHOCYTES # BLD AUTO: 1.07 K/UL
LYMPHOCYTES NFR BLD AUTO: 27.2 %
MAN DIFF?: NORMAL
MCHC RBC-ENTMCNC: 31.1 PG
MCHC RBC-ENTMCNC: 32.9 G/DL
MCV RBC AUTO: 94.3 FL
MONOCYTES # BLD AUTO: 0.69 K/UL
MONOCYTES NFR BLD AUTO: 17.5 %
NEUTROPHILS # BLD AUTO: 2.09 K/UL
NEUTROPHILS NFR BLD AUTO: 53 %
PLATELET # BLD AUTO: 172 K/UL
RBC # BLD: 4.57 M/UL
RBC # FLD: 13.4 %
WBC # FLD AUTO: 3.94 K/UL

## 2025-08-14 PROCEDURE — 70450 CT HEAD/BRAIN W/O DYE: CPT | Mod: 26

## 2025-08-14 PROCEDURE — 99203 OFFICE O/P NEW LOW 30 MIN: CPT

## 2025-08-14 PROCEDURE — 70450 CT HEAD/BRAIN W/O DYE: CPT

## 2025-08-14 PROCEDURE — G2211 COMPLEX E/M VISIT ADD ON: CPT

## 2025-08-15 ENCOUNTER — NON-APPOINTMENT (OUTPATIENT)
Age: 70
End: 2025-08-15